# Patient Record
Sex: MALE | Race: WHITE | NOT HISPANIC OR LATINO | Employment: OTHER | URBAN - METROPOLITAN AREA
[De-identification: names, ages, dates, MRNs, and addresses within clinical notes are randomized per-mention and may not be internally consistent; named-entity substitution may affect disease eponyms.]

---

## 2019-05-16 ENCOUNTER — TRANSCRIBE ORDERS (OUTPATIENT)
Dept: CARDIOLOGY | Facility: CLINIC | Age: 64
End: 2019-05-16

## 2019-05-16 ENCOUNTER — PREP FOR SURGERY (OUTPATIENT)
Dept: OTHER | Facility: HOSPITAL | Age: 64
End: 2019-05-16

## 2019-05-16 DIAGNOSIS — R94.39 ABNORMAL STRESS TEST: Primary | ICD-10-CM

## 2019-05-16 DIAGNOSIS — I20.9 ANGINA PECTORIS (HCC): Primary | ICD-10-CM

## 2019-05-16 RX ORDER — ASPIRIN 325 MG
325 TABLET, DELAYED RELEASE (ENTERIC COATED) ORAL DAILY
Status: CANCELLED | OUTPATIENT
Start: 2019-05-17

## 2019-05-16 RX ORDER — SODIUM CHLORIDE 0.9 % (FLUSH) 0.9 %
3-10 SYRINGE (ML) INJECTION AS NEEDED
Status: CANCELLED | OUTPATIENT
Start: 2019-05-16

## 2019-05-16 RX ORDER — SODIUM CHLORIDE 0.9 % (FLUSH) 0.9 %
3 SYRINGE (ML) INJECTION EVERY 12 HOURS SCHEDULED
Status: CANCELLED | OUTPATIENT
Start: 2019-05-16

## 2019-05-16 RX ORDER — ONDANSETRON 2 MG/ML
4 INJECTION INTRAMUSCULAR; INTRAVENOUS EVERY 8 HOURS PRN
Status: CANCELLED | OUTPATIENT
Start: 2019-05-16

## 2019-05-16 RX ORDER — ASPIRIN 325 MG
325 TABLET ORAL ONCE
Status: CANCELLED | OUTPATIENT
Start: 2019-05-16 | End: 2019-05-16

## 2019-05-16 RX ORDER — NITROGLYCERIN 0.4 MG/1
0.4 TABLET SUBLINGUAL
Status: CANCELLED | OUTPATIENT
Start: 2019-05-16

## 2019-05-20 ENCOUNTER — APPOINTMENT (OUTPATIENT)
Dept: CARDIOLOGY | Facility: HOSPITAL | Age: 64
End: 2019-05-20

## 2019-05-20 ENCOUNTER — HOSPITAL ENCOUNTER (OUTPATIENT)
Facility: HOSPITAL | Age: 64
Discharge: HOME | End: 2019-05-20
Attending: INTERNAL MEDICINE | Admitting: INTERNAL MEDICINE

## 2019-05-20 VITALS
BODY MASS INDEX: 27.31 KG/M2 | RESPIRATION RATE: 14 BRPM | DIASTOLIC BLOOD PRESSURE: 88 MMHG | TEMPERATURE: 98 F | SYSTOLIC BLOOD PRESSURE: 123 MMHG | WEIGHT: 174 LBS | OXYGEN SATURATION: 95 % | HEIGHT: 67 IN | HEART RATE: 58 BPM

## 2019-05-20 DIAGNOSIS — R94.39 ABNORMAL STRESS TEST: ICD-10-CM

## 2019-05-20 PROBLEM — I10 ESSENTIAL HYPERTENSION: Status: ACTIVE | Noted: 2019-05-20

## 2019-05-20 PROBLEM — I42.0 CARDIOMYOPATHY, DILATED: Status: ACTIVE | Noted: 2019-05-20

## 2019-05-20 LAB
ANION GAP SERPL CALCULATED.3IONS-SCNC: 14 MMOL/L
BH CV ECHO MEAS - LA DIMENSION: 5.2 CM
BUN BLD-MCNC: 8 MG/DL (ref 8–23)
BUN/CREAT SERPL: 12.1 (ref 7–25)
CALCIUM SPEC-SCNC: 8.5 MG/DL (ref 8.6–10.5)
CHLORIDE SERPL-SCNC: 107 MMOL/L (ref 98–107)
CHOLEST SERPL-MCNC: 119 MG/DL (ref 0–200)
CO2 SERPL-SCNC: 20 MMOL/L (ref 22–29)
CREAT BLD-MCNC: 0.66 MG/DL (ref 0.76–1.27)
DEPRECATED RDW RBC AUTO: 45.8 FL (ref 37–54)
ERYTHROCYTE [DISTWIDTH] IN BLOOD BY AUTOMATED COUNT: 13.9 % (ref 12.3–15.4)
GFR SERPL CREATININE-BSD FRML MDRD: 121 ML/MIN/1.73
GLUCOSE BLD-MCNC: 87 MG/DL (ref 65–99)
HBA1C MFR BLD: 5.5 % (ref 4.8–5.6)
HCT VFR BLD AUTO: 42.2 % (ref 37.5–51)
HDLC SERPL-MCNC: 34 MG/DL (ref 40–60)
HGB BLD-MCNC: 14.1 G/DL (ref 13–17.7)
LDLC SERPL CALC-MCNC: 71 MG/DL (ref 0–100)
LDLC/HDLC SERPL: 2.08 {RATIO}
LV EF 2D ECHO EST: 55 %
MCH RBC QN AUTO: 30.3 PG (ref 26.6–33)
MCHC RBC AUTO-ENTMCNC: 33.4 G/DL (ref 31.5–35.7)
MCV RBC AUTO: 90.8 FL (ref 79–97)
PLATELET # BLD AUTO: 230 10*3/MM3 (ref 140–450)
PMV BLD AUTO: 11.1 FL (ref 6–12)
POTASSIUM BLD-SCNC: 3.7 MMOL/L (ref 3.5–5.2)
RBC # BLD AUTO: 4.65 10*6/MM3 (ref 4.14–5.8)
SODIUM BLD-SCNC: 141 MMOL/L (ref 136–145)
TRIGL SERPL-MCNC: 72 MG/DL (ref 0–150)
VLDLC SERPL-MCNC: 14.4 MG/DL
WBC NRBC COR # BLD: 12.54 10*3/MM3 (ref 3.4–10.8)

## 2019-05-20 PROCEDURE — 25010000002 HEPARIN (PORCINE) PER 1000 UNITS: Performed by: INTERNAL MEDICINE

## 2019-05-20 PROCEDURE — 92960 CARDIOVERSION ELECTRIC EXT: CPT

## 2019-05-20 PROCEDURE — 93458 L HRT ARTERY/VENTRICLE ANGIO: CPT | Performed by: INTERNAL MEDICINE

## 2019-05-20 PROCEDURE — C1894 INTRO/SHEATH, NON-LASER: HCPCS | Performed by: INTERNAL MEDICINE

## 2019-05-20 PROCEDURE — 93325 DOPPLER ECHO COLOR FLOW MAPG: CPT

## 2019-05-20 PROCEDURE — 93325 DOPPLER ECHO COLOR FLOW MAPG: CPT | Performed by: INTERNAL MEDICINE

## 2019-05-20 PROCEDURE — C1769 GUIDE WIRE: HCPCS | Performed by: INTERNAL MEDICINE

## 2019-05-20 PROCEDURE — 80048 BASIC METABOLIC PNL TOTAL CA: CPT | Performed by: INTERNAL MEDICINE

## 2019-05-20 PROCEDURE — 93312 ECHO TRANSESOPHAGEAL: CPT

## 2019-05-20 PROCEDURE — 93312 ECHO TRANSESOPHAGEAL: CPT | Performed by: INTERNAL MEDICINE

## 2019-05-20 PROCEDURE — 93321 DOPPLER ECHO F-UP/LMTD STD: CPT | Performed by: INTERNAL MEDICINE

## 2019-05-20 PROCEDURE — 93321 DOPPLER ECHO F-UP/LMTD STD: CPT

## 2019-05-20 PROCEDURE — 36415 COLL VENOUS BLD VENIPUNCTURE: CPT

## 2019-05-20 PROCEDURE — 83036 HEMOGLOBIN GLYCOSYLATED A1C: CPT | Performed by: PHYSICIAN ASSISTANT

## 2019-05-20 PROCEDURE — 0 IOPAMIDOL PER 1 ML: Performed by: INTERNAL MEDICINE

## 2019-05-20 PROCEDURE — 92960 CARDIOVERSION ELECTRIC EXT: CPT | Performed by: INTERNAL MEDICINE

## 2019-05-20 PROCEDURE — 80061 LIPID PANEL: CPT | Performed by: PHYSICIAN ASSISTANT

## 2019-05-20 PROCEDURE — 25010000002 MIDAZOLAM PER 1 MG: Performed by: INTERNAL MEDICINE

## 2019-05-20 PROCEDURE — 85027 COMPLETE CBC AUTOMATED: CPT | Performed by: INTERNAL MEDICINE

## 2019-05-20 RX ORDER — ASPIRIN 325 MG
325 TABLET ORAL ONCE
Status: COMPLETED | OUTPATIENT
Start: 2019-05-20 | End: 2019-05-20

## 2019-05-20 RX ORDER — NITROGLYCERIN 0.4 MG/1
0.4 TABLET SUBLINGUAL
Status: DISCONTINUED | OUTPATIENT
Start: 2019-05-20 | End: 2019-05-20 | Stop reason: HOSPADM

## 2019-05-20 RX ORDER — MIDAZOLAM HYDROCHLORIDE 1 MG/ML
INJECTION INTRAMUSCULAR; INTRAVENOUS AS NEEDED
Status: DISCONTINUED | OUTPATIENT
Start: 2019-05-20 | End: 2019-05-20 | Stop reason: HOSPADM

## 2019-05-20 RX ORDER — LIDOCAINE HYDROCHLORIDE 10 MG/ML
INJECTION, SOLUTION INFILTRATION; PERINEURAL AS NEEDED
Status: DISCONTINUED | OUTPATIENT
Start: 2019-05-20 | End: 2019-05-20 | Stop reason: HOSPADM

## 2019-05-20 RX ORDER — FENTANYL CITRATE 50 UG/ML
INJECTION, SOLUTION INTRAMUSCULAR; INTRAVENOUS
Status: DISCONTINUED
Start: 2019-05-20 | End: 2019-05-20 | Stop reason: WASHOUT

## 2019-05-20 RX ORDER — MIDAZOLAM HYDROCHLORIDE 1 MG/ML
INJECTION INTRAMUSCULAR; INTRAVENOUS
Status: COMPLETED | OUTPATIENT
Start: 2019-05-20 | End: 2019-05-20

## 2019-05-20 RX ORDER — MIDAZOLAM HYDROCHLORIDE 1 MG/ML
INJECTION INTRAMUSCULAR; INTRAVENOUS
Status: DISCONTINUED
Start: 2019-05-20 | End: 2019-05-20 | Stop reason: HOSPADM

## 2019-05-20 RX ORDER — SODIUM CHLORIDE 0.9 % (FLUSH) 0.9 %
3-10 SYRINGE (ML) INJECTION AS NEEDED
Status: DISCONTINUED | OUTPATIENT
Start: 2019-05-20 | End: 2019-05-20 | Stop reason: HOSPADM

## 2019-05-20 RX ORDER — SODIUM CHLORIDE 9 MG/ML
1-3 INJECTION, SOLUTION INTRAVENOUS CONTINUOUS
Status: DISCONTINUED | OUTPATIENT
Start: 2019-05-20 | End: 2019-05-20 | Stop reason: HOSPADM

## 2019-05-20 RX ORDER — ASPIRIN 81 MG/1
81 TABLET ORAL DAILY
COMMUNITY
End: 2023-03-21 | Stop reason: HOSPADM

## 2019-05-20 RX ORDER — LABETALOL 300 MG/1
300 TABLET, FILM COATED ORAL 2 TIMES DAILY
COMMUNITY
End: 2019-05-20 | Stop reason: HOSPADM

## 2019-05-20 RX ORDER — AMLODIPINE BESYLATE 5 MG/1
5 TABLET ORAL DAILY
COMMUNITY
End: 2021-02-02

## 2019-05-20 RX ORDER — SOTALOL HYDROCHLORIDE 80 MG/1
80 TABLET ORAL ONCE
Status: COMPLETED | OUTPATIENT
Start: 2019-05-20 | End: 2019-05-20

## 2019-05-20 RX ORDER — ALBUTEROL SULFATE 90 UG/1
2 AEROSOL, METERED RESPIRATORY (INHALATION) EVERY 4 HOURS PRN
COMMUNITY
End: 2019-07-16

## 2019-05-20 RX ORDER — SOTALOL HYDROCHLORIDE 80 MG/1
80 TABLET ORAL 2 TIMES DAILY
Qty: 60 TABLET | Refills: 6 | Status: SHIPPED | OUTPATIENT
Start: 2019-05-20 | End: 2019-05-23 | Stop reason: SDUPTHER

## 2019-05-20 RX ORDER — OXYCODONE AND ACETAMINOPHEN 10; 325 MG/1; MG/1
1 TABLET ORAL EVERY 6 HOURS PRN
COMMUNITY
End: 2019-07-16

## 2019-05-20 RX ORDER — ASPIRIN 325 MG
325 TABLET, DELAYED RELEASE (ENTERIC COATED) ORAL DAILY
Status: DISCONTINUED | OUTPATIENT
Start: 2019-05-21 | End: 2019-05-20 | Stop reason: HOSPADM

## 2019-05-20 RX ORDER — FLUTICASONE PROPIONATE 50 MCG
2 SPRAY, SUSPENSION (ML) NASAL DAILY
COMMUNITY
End: 2019-07-16

## 2019-05-20 RX ORDER — SODIUM CHLORIDE 9 MG/ML
100 INJECTION, SOLUTION INTRAVENOUS CONTINUOUS
Status: ACTIVE | OUTPATIENT
Start: 2019-05-20 | End: 2019-05-20

## 2019-05-20 RX ORDER — ATORVASTATIN CALCIUM 40 MG/1
40 TABLET, FILM COATED ORAL DAILY
COMMUNITY
End: 2019-07-16

## 2019-05-20 RX ORDER — MELOXICAM 15 MG/1
15 TABLET ORAL DAILY
COMMUNITY
End: 2019-07-16

## 2019-05-20 RX ORDER — NALOXONE HYDROCHLORIDE 0.4 MG/ML
INJECTION, SOLUTION INTRAMUSCULAR; INTRAVENOUS; SUBCUTANEOUS
Status: DISCONTINUED
Start: 2019-05-20 | End: 2019-05-20 | Stop reason: WASHOUT

## 2019-05-20 RX ORDER — ONDANSETRON 2 MG/ML
4 INJECTION INTRAMUSCULAR; INTRAVENOUS EVERY 8 HOURS PRN
Status: DISCONTINUED | OUTPATIENT
Start: 2019-05-20 | End: 2019-05-20 | Stop reason: HOSPADM

## 2019-05-20 RX ORDER — SODIUM CHLORIDE 0.9 % (FLUSH) 0.9 %
3 SYRINGE (ML) INJECTION EVERY 12 HOURS SCHEDULED
Status: DISCONTINUED | OUTPATIENT
Start: 2019-05-20 | End: 2019-05-20 | Stop reason: HOSPADM

## 2019-05-20 RX ORDER — FLUMAZENIL 0.1 MG/ML
INJECTION INTRAVENOUS
Status: DISCONTINUED
Start: 2019-05-20 | End: 2019-05-20 | Stop reason: WASHOUT

## 2019-05-20 RX ORDER — ZOLPIDEM TARTRATE 10 MG/1
10 TABLET ORAL NIGHTLY PRN
COMMUNITY
End: 2019-07-16

## 2019-05-20 RX ADMIN — METHOHEXITAL SODIUM 40 MG: 500 INJECTION, POWDER, LYOPHILIZED, FOR SOLUTION INTRAMUSCULAR; INTRAVENOUS; RECTAL at 10:02

## 2019-05-20 RX ADMIN — SODIUM CHLORIDE 3 ML/KG/HR: 9 INJECTION, SOLUTION INTRAVENOUS at 07:46

## 2019-05-20 RX ADMIN — ASPIRIN 325 MG ORAL TABLET 325 MG: 325 PILL ORAL at 07:46

## 2019-05-20 RX ADMIN — MIDAZOLAM HYDROCHLORIDE 2 MG: 1 INJECTION, SOLUTION INTRAMUSCULAR; INTRAVENOUS at 10:01

## 2019-05-20 RX ADMIN — SOTALOL HYDROCHLORIDE 80 MG: 80 TABLET ORAL at 10:45

## 2019-05-20 RX ADMIN — RIVAROXABAN 20 MG: 20 TABLET, FILM COATED ORAL at 10:45
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
## 2019-05-23 RX ORDER — SOTALOL HYDROCHLORIDE 80 MG/1
80 TABLET ORAL 2 TIMES DAILY
Qty: 60 TABLET | Refills: 6 | Status: SHIPPED | OUTPATIENT
Start: 2019-05-23 | End: 2019-07-16

## 2019-07-16 ENCOUNTER — OFFICE VISIT (OUTPATIENT)
Dept: CARDIOLOGY | Facility: CLINIC | Age: 64
End: 2019-07-16

## 2019-07-16 VITALS
BODY MASS INDEX: 26.68 KG/M2 | HEART RATE: 85 BPM | DIASTOLIC BLOOD PRESSURE: 86 MMHG | HEIGHT: 67 IN | WEIGHT: 170 LBS | SYSTOLIC BLOOD PRESSURE: 127 MMHG

## 2019-07-16 DIAGNOSIS — I10 ESSENTIAL HYPERTENSION: ICD-10-CM

## 2019-07-16 DIAGNOSIS — I42.0 CARDIOMYOPATHY, DILATED (HCC): ICD-10-CM

## 2019-07-16 DIAGNOSIS — I48.21 PERMANENT ATRIAL FIBRILLATION (HCC): Primary | ICD-10-CM

## 2019-07-16 DIAGNOSIS — Z72.0 TOBACCO USE: ICD-10-CM

## 2019-07-16 DIAGNOSIS — I48.0 PAROXYSMAL ATRIAL FIBRILLATION (HCC): ICD-10-CM

## 2019-07-16 DIAGNOSIS — I25.10 CORONARY ARTERY DISEASE INVOLVING NATIVE CORONARY ARTERY OF NATIVE HEART WITHOUT ANGINA PECTORIS: ICD-10-CM

## 2019-07-16 DIAGNOSIS — E78.2 MIXED HYPERLIPIDEMIA: ICD-10-CM

## 2019-07-16 PROCEDURE — 99214 OFFICE O/P EST MOD 30 MIN: CPT | Performed by: INTERNAL MEDICINE

## 2019-07-16 PROCEDURE — 93000 ELECTROCARDIOGRAM COMPLETE: CPT | Performed by: INTERNAL MEDICINE

## 2019-07-16 RX ORDER — ATORVASTATIN CALCIUM 40 MG/1
40 TABLET, FILM COATED ORAL DAILY
Qty: 90 TABLET | Refills: 3 | Status: SHIPPED | OUTPATIENT
Start: 2019-07-16 | End: 2022-05-03

## 2019-07-16 NOTE — PROGRESS NOTES
Encounter Date:07/16/2019        Patient ID: Eliud Crouch is a 64 y.o. male.    PCP: Jimmie Alonso MD     Chief Complaint: Coronary Artery Disease      PROBLEM LIST:  1. Coronary artery disease:  a. Remote septal MI.  b. Previous stent to the RCA.  c. [facility], 08/17/2003, Dr. Enrrique Morse: Patent RCA stent, normal LV.  d. [facility], 10/31/2012: EF 55-60%, mild MR, nonobstructive disease involving multiple vessels.  e. [facility], 02/23/2015: Nonobstructive plaque/disease that involved multiple vessels. EF 35%.  f. Stress test, 05/06/2019: Ischemia and scar in the LAD distribution. EF 50%.  g. [facility], 05/20/2019: Nonobstructive CAD involving multiple vessels without hemodynamically significant disease. EF 55%.  2. Atrial fibrillation:  a. PIPER, 05/20/2019: EF 55%. Left atrial cavity is moderately dilated. Mild MR/TR. No intracardial thrombus or mass, clear DACIA.  b. ECV, 05/20/2019: Successful cardioversion.  3. Dilated cardiomyopathy:  a. PIPER, 02/22/2015: Dilated cardiomyopathy with EF 40%.  4. Hypertensio.  5. Hyperlipidemia.  6. Paroxysmal atrial fibrillation:  a. History of atrial fibrillation with external cardioversion 2003.  b. EKGs and Holter monitoring showing atrial fibrillation with controlled rate.  c. CHADS-VASc score = 3  d. ECV to SR 2-23-15  e. Recurrent AFib with controlled rate  7. Carotid disease:  a. Carotid duplex, 10/2012: Minimal plaque, normal velocities.  8. Tobacco use:  a. Smokes 2 PPD per 7/16/2019 visit.  9. GERD.  10. Osteoarthritis.  11. Chronic neck and back pain.    History of Present Illness  Patient presents today for hospital follow-up s/p catheterization and cardioversion. Patient has a history of coronary artery disease, atrial fibrillation, dilated cardiomyopathy, and cardiac risk factors. Since last visit, he has been doing well overall from a cardiovascular standpoint. He was taken off sotalol, details are not available patient states that it was by his PCP's office. Denies  "chest pain, shortness of breath, leg swelling, palpitations, and syncope. Remains busy and active with no significant cardiac limitations. Patient admits he still smokes 2 packs of cigarettes per day.    Allergies   Allergen Reactions   • Morphine Anxiety         Current Outpatient Medications:   •  amLODIPine (NORVASC) 5 MG tablet, Take 5 mg by mouth Daily., Disp: , Rfl:   •  apixaban (ELIQUIS) 5 MG tablet tablet, Take 5 mg by mouth 2 (Two) Times a Day., Disp: , Rfl:   •  aspirin 81 MG EC tablet, Take 81 mg by mouth Daily., Disp: , Rfl:     The following portions of the patient's history were reviewed and updated as appropriate: allergies, current medications, past family history, past medical history, past social history, past surgical history and problem list.    ROS  Review of Systems   Constitution: Negative for chills, fatigue, fever, generalized weakness, weight gain and weight loss.   Cardiovascular: Negative for chest pain, claudication, dyspnea on exertion, leg swelling, orthopnea, palpitations, paroxysmal nocturnal dyspnea and syncope.   Respiratory: Negative for cough, shortness of breath, and wheezing.  HENT: Negative for ear pain, nosebleeds, and tinnitus.  Gastrointestinal: Negative for abdominal pain, constipation, diarrhea, nausea and vomiting.   Genitourinary: No urinary symptoms   Musculoskeletal: Negative for muscle cramps.  Neurological: Negative for dizziness, headaches, loss of balance, numbness, and symptoms of stroke.  Psychiatric: Normal mental status.     All other systems reviewed and are negative.    Objective:     /86 (BP Location: Right arm, Patient Position: Sitting)   Pulse 85   Ht 170.2 cm (67\")   Wt 77.1 kg (170 lb)   BMI 26.63 kg/m²        Physical Exam  Constitutional: Patient appears well-developed and well-nourished.   HENT: HEENT exam unremarkable.   Neck: Neck supple. No JVD present. No carotid bruits.   Cardiovascular: Normal rate, regular rhythm and normal heart " sounds. No murmur heard.   2+ symmetric pulses.   Pulmonary/Chest: Breath sounds normal. Does not exhibit tenderness.   Abdominal: Abdomen benign.   Musculoskeletal: Does not exhibit edema.   Neurological: Neurological exam unremarkable.   Vitals reviewed.    Lab Review:   Lab Results   Component Value Date    GLUCOSE 87 05/20/2019    BUN 8 05/20/2019    CREATININE 0.66 (L) 05/20/2019    EGFRIFNONA 121 05/20/2019    BCR 12.1 05/20/2019    K 3.7 05/20/2019    CO2 20.0 (L) 05/20/2019    CALCIUM 8.5 (L) 05/20/2019     Lab Results   Component Value Date    CHOL 119 05/20/2019    CHLPL 91 02/23/2015    TRIG 72 05/20/2019    HDL 34 (L) 05/20/2019    LDL 71 05/20/2019      Lab Results   Component Value Date    WBC 12.54 (H) 05/20/2019    HGB 14.1 05/20/2019    HCT 42.2 05/20/2019    MCV 90.8 05/20/2019     05/20/2019     Lab Results   Component Value Date    HGBA1C 5.50 05/20/2019          ECG 12 Lead  Date/Time: 7/16/2019 11:17 AM  Performed by: Rick Whitten MD  Authorized by: Rick Whitten MD   Comparison: compared with previous ECG   Similar to previous ECG  Rhythm: atrial fibrillation    Clinical impression: non-specific ECG  Comments: Non specific ST changes               Assessment:      Diagnosis Plan   1. Permanent atrial fibrillation (CMS/HCC)  Continue Eliquis 5 mg BID for stroke prophylaxis.  Rate is controlled without medications, he has failed to maintain sinus rhythm and was taken off sotalol, remain off sotalol for now.   2. Coronary artery disease involving native coronary artery of native heart without angina pectoris  Continue aspirin 81 mg.   3. Cardiomyopathy, dilated (CMS/HCC)  Stable with no heart failure symptoms.   4. Essential hypertension  Well-controlled, continue current medications.   5. Mixed hyperlipidemia  Get back on atorvastatin 40 mg which she discontinued due to unclear reasons..   6. Tobacco use  Smoking cessation strongly recommended.     Plan:   Get back on atorvastatin 40  mg.  Continue current medications.   Smoking cessation strongly recommended.  FU in 6 MO, sooner as needed.  Thank you for allowing us to participate in the care of your patient.     Scribed for Rick Whitten MD by Sherron Vasquez. 7/16/2019  11:21 AM      I, Rick Whitten MD, personally performed the services described in this documentation as scribed by the above named individual in my presence, and it is both accurate and complete.  7/16/2019  11:53 AM        Please note that portions of this note may have been completed with a voice recognition program. Efforts were made to edit the dictations, but occasionally words are mistranscribed.

## 2020-01-21 ENCOUNTER — OFFICE VISIT (OUTPATIENT)
Dept: CARDIOLOGY | Facility: CLINIC | Age: 65
End: 2020-01-21

## 2020-01-21 VITALS
DIASTOLIC BLOOD PRESSURE: 80 MMHG | HEIGHT: 67 IN | HEART RATE: 53 BPM | SYSTOLIC BLOOD PRESSURE: 110 MMHG | WEIGHT: 171 LBS | BODY MASS INDEX: 26.84 KG/M2

## 2020-01-21 DIAGNOSIS — E78.2 MIXED HYPERLIPIDEMIA: ICD-10-CM

## 2020-01-21 DIAGNOSIS — I48.21 PERMANENT ATRIAL FIBRILLATION (HCC): Primary | ICD-10-CM

## 2020-01-21 DIAGNOSIS — I25.119 CORONARY ARTERY DISEASE INVOLVING NATIVE CORONARY ARTERY OF NATIVE HEART WITH ANGINA PECTORIS (HCC): ICD-10-CM

## 2020-01-21 DIAGNOSIS — I10 ESSENTIAL HYPERTENSION: ICD-10-CM

## 2020-01-21 DIAGNOSIS — Z72.0 TOBACCO USE: ICD-10-CM

## 2020-01-21 PROCEDURE — 99214 OFFICE O/P EST MOD 30 MIN: CPT | Performed by: INTERNAL MEDICINE

## 2020-01-21 RX ORDER — ISOSORBIDE MONONITRATE 30 MG/1
30 TABLET, EXTENDED RELEASE ORAL DAILY
Qty: 90 TABLET | Refills: 3 | Status: SHIPPED | OUTPATIENT
Start: 2020-01-21 | End: 2021-02-02

## 2020-01-21 NOTE — PROGRESS NOTES
[facility]    Encounter Date:2020        Patient ID: Eliud Crouch is a  64 y.o. male.  : 1955     PCP: Jimmie Alonso MD     Chief Complaint: Permanent atrial fibrillation (CMS/HCC)    PROBLEM LIST:  1. Coronary artery disease:  a. Remote septal MI.  b. Previous stent to the RCA.  c. [facility], 2003, Dr. Enrrique Morse: Patent RCA stent, normal LV.  d. [facility], 10/31/2012: EF 55-60%, mild MR, nonobstructive disease involving multiple vessels.  e. [facility], 2015: Nonobstructive plaque/disease that involved multiple vessels. EF 35%.  f. Stress test, 2019: Ischemia and scar in the LAD distribution. EF 50%.  g. [facility], 2019: Nonobstructive CAD involving multiple vessels without hemodynamically significant disease. EF 55%.  2. Paroxysmal atrial fibrillation:  a. CHADS-VASc score = 3 (HTN, Age 65-74, CAD)  b. History of atrial fibrillation with external cardioversion .  c. EKGs and Holter monitoring showing atrial fibrillation with controlled rate.  d. PIPER/ECV to SR, 2015.  e. Recurrent AFib with controlled rate  f. PIPER, 2019: EF 55%. Left atrial cavity is moderately dilated. Mild MR/TR. No intracardial thrombus or mass, clear DACIA.  g. ECV, 2019: Successful cardioversion.  3. Dilated cardiomyopathy:  a. PIPER, 2015: Dilated cardiomyopathy with EF 40%.  4. Hypertension.  5. Hyperlipidemia.  6. Carotid plaque:  a. Carotid duplex, 10/2012: Minimal plaque, normal velocities.  7. Tobacco use:  a. Smokes 2 PPD per 2019 visit.  8. GERD.  9. Osteoarthritis.  10. Chronic neck and back pain.    History of Present Illness  Patient presents today for 6 month follow-up with a history of coronary artery disease, paroxysmal atrial fibrillation, and cardiac risk factors. Since last visit, he has been experiencing some shortness of breath when walking, climbing stairs, and bending over. He also reports some chest pain, which he  "describes as a burning sensation. This happens \"pretty much all the time\", and sometimes wakes him from sleep. Pt admits he still smokes, but does want to try to quit.    Allergies   Allergen Reactions   • Morphine Anxiety         Current Outpatient Medications:   •  amLODIPine (NORVASC) 5 MG tablet, Take 5 mg by mouth Daily., Disp: , Rfl:   •  apixaban (ELIQUIS) 5 MG tablet tablet, Take 5 mg by mouth 2 (Two) Times a Day., Disp: , Rfl:   •  aspirin 81 MG EC tablet, Take 81 mg by mouth Daily., Disp: , Rfl:   •  atorvastatin (LIPITOR) 40 MG tablet, Take 1 tablet by mouth Daily., Disp: 90 tablet, Rfl: 3    The following portions of the patient's history were reviewed and updated as appropriate: allergies, current medications, past family history, past medical history, past social history, past surgical history and problem list.    ROS  Review of Systems   Constitution: Negative for chills, fatigue, fever, generalized weakness.   Cardiovascular: Negative for claudication, leg swelling, orthopnea, palpitations, paroxysmal nocturnal dyspnea and syncope. Positive for chest pain and dyspnea on exertion.  Respiratory: Negative for cough and wheezing. Positive for shortness of breath.  HENT: Negative for ear pain, nosebleeds, and tinnitus.  Gastrointestinal: Negative for abdominal pain, constipation, diarrhea, nausea and vomiting. Positive for heartburn.  Genitourinary: No urinary symptoms.  Musculoskeletal: Negative for muscle cramps.  Neurological: Negative for dizziness, headaches, loss of balance, numbness, and symptoms of stroke.  Psychiatric: Normal mental status.     All other systems reviewed and are negative.    Objective:     /80 (BP Location: Left arm, Patient Position: Sitting)   Pulse 53   Ht 170.2 cm (67\")   Wt 77.6 kg (171 lb)   BMI 26.78 kg/m²        Physical Exam  Constitutional: Patient appears well-developed and well-nourished.   HENT: HEENT exam unremarkable.   Neck: Neck supple. No JVD present. " No carotid bruits.   Cardiovascular: Normal rate, regular rhythm and normal heart sounds. No murmur heard.   2+ symmetric pulses.   Pulmonary/Chest: Breath sounds normal. Does not exhibit tenderness.   Abdominal: Abdomen benign.   Musculoskeletal: Does not exhibit edema.   Neurological: Neurological exam unremarkable.   Vitals reviewed.    Lab Review:   Lab Results   Component Value Date    GLUCOSE 87 05/20/2019    BUN 8 05/20/2019    CREATININE 0.66 (L) 05/20/2019    EGFRIFNONA 121 05/20/2019    BCR 12.1 05/20/2019    K 3.7 05/20/2019    CO2 20.0 (L) 05/20/2019    CALCIUM 8.5 (L) 05/20/2019     Lab Results   Component Value Date    CHOL 119 05/20/2019    CHLPL 91 02/23/2015    TRIG 72 05/20/2019    HDL 34 (L) 05/20/2019    LDL 71 05/20/2019      Lab Results   Component Value Date    WBC 12.54 (H) 05/20/2019    HGB 14.1 05/20/2019    HCT 42.2 05/20/2019    MCV 90.8 05/20/2019     05/20/2019     Lab Results   Component Value Date    HGBA1C 5.50 05/20/2019        Procedures       Assessment:      Diagnosis Plan   1. Permanent atrial fibrillation  Continue Eliquis 5 mg BID. Pt and his family were educated on the importance of anticoagulation for stroke prophylaxis given his permanent Afib.   2. Coronary artery disease involving native coronary artery of native heart with angina pectoris  [facility] from May 2019 was reviewed with the patient. Start Imdur 30 mg daily for chest pain. Continue aspirin 81 mg for antiplatelet therapy.    3. Essential hypertension  Well-controlled, continue amlodipine 5 mg.   4. Mixed hyperlipidemia  Well-controlled, continue atorvastatin 40 mg.   5. Tobacco abuse Smoking cessation strongly recommended.     Plan:   Smoking cessation strongly recommended.  Start Imdur 30 mg daily for chest pain, possibly spastic angina.  Findings of unremarkable cardiac catheterization study discussed and he was reassured.  Continue current medications.   FU in 6 MO, sooner as needed.  Thank you for allowing  us to participate in the care of your patient.     Scribed for Rick Whitten MD by Sherron Vasquez. 1/21/2020  12:03 PM      I, Rick Whitten MD, personally performed the services described in this documentation as scribed by the above named individual in my presence, and it is both accurate and complete.  1/21/2020  12:07 PM      Please note that portions of this note may have been completed with a voice recognition program. Efforts were made to edit the dictations, but occasionally words are mistranscribed.

## 2020-01-28 ENCOUNTER — TELEPHONE (OUTPATIENT)
Dept: CARDIOLOGY | Facility: CLINIC | Age: 65
End: 2020-01-28

## 2020-01-28 NOTE — TELEPHONE ENCOUNTER
Pt calling requesting CC for EGD and Colonoscopy. Pt currently on Eliquis and ASA.       CC Letter to be faxed to [phone]

## 2020-01-28 NOTE — TELEPHONE ENCOUNTER
Cleared from cardiology standpoint, okay to hold Eliquis for 2 to 3 days and aspirin if his gastroenterologist insists.

## 2020-08-04 ENCOUNTER — OFFICE VISIT (OUTPATIENT)
Dept: CARDIOLOGY | Facility: CLINIC | Age: 65
End: 2020-08-04

## 2020-08-04 VITALS
HEART RATE: 58 BPM | HEIGHT: 67 IN | DIASTOLIC BLOOD PRESSURE: 88 MMHG | SYSTOLIC BLOOD PRESSURE: 128 MMHG | BODY MASS INDEX: 25.9 KG/M2 | WEIGHT: 165 LBS

## 2020-08-04 DIAGNOSIS — I25.119 CORONARY ARTERY DISEASE INVOLVING NATIVE CORONARY ARTERY OF NATIVE HEART WITH ANGINA PECTORIS (HCC): Primary | ICD-10-CM

## 2020-08-04 DIAGNOSIS — E78.2 MIXED HYPERLIPIDEMIA: ICD-10-CM

## 2020-08-04 DIAGNOSIS — I48.21 PERMANENT ATRIAL FIBRILLATION (HCC): ICD-10-CM

## 2020-08-04 DIAGNOSIS — I10 ESSENTIAL HYPERTENSION: ICD-10-CM

## 2020-08-04 PROCEDURE — 99214 OFFICE O/P EST MOD 30 MIN: CPT | Performed by: INTERNAL MEDICINE

## 2020-08-04 NOTE — PROGRESS NOTES
[facility] Cardiology    Encounter Date: 2020    Patient ID: Eliud Crouch is a 65 y.o. male.  : 1955     PCP: Jimmie lAonso MD       Chief Complaint: No chief complaint on file.      PROBLEM LIST:  1. Coronary artery disease:  a. Remote septal MI.  b. Previous stent to the RCA.  c. [facility], 2003, Dr. Enrrique Morse: Patent RCA stent, normal LV.  d. [facility], 10/31/2012: EF 55-60%, mild MR, nonobstructive disease involving multiple vessels.  e. [facility], 2015: Nonobstructive plaque/disease that involved multiple vessels. EF 35%.  f. Stress test, 2019: Ischemia and scar in the LAD distribution. EF 50%.  g. C, 2019: Nonobstructive CAD involving multiple vessels without hemodynamically significant disease. EF 55%.  2. Paroxysmal atrial fibrillation:  a. CHADS-VASc score = 3 (HTN, Age 65-74, CAD)  b. History of atrial fibrillation with external cardioversion .  c. EKGs and Holter monitoring showing atrial fibrillation with controlled rate.  d. PIPER/ECV to SR, 2015.  e. Recurrent AFib with controlled rate  f. PIPER, 2019: EF 55%. Left atrial cavity is moderately dilated. Mild MR/TR. No intracardial thrombus or mass, clear DACIA.  g. ECV, 2019: Successful cardioversion.  3. Dilated cardiomyopathy:  a. PIPER, 2015: Dilated cardiomyopathy with EF 40%.  4. Hypertension.  5. Hyperlipidemia.  6. Carotid plaque:  a. Carotid duplex, 10/2012: Minimal plaque, normal velocities.  7. Tobacco use:  a. Smokes 2 PPD per 2019 visit.  8. GERD.  9. Osteoarthritis.  10. Chronic neck and back pain.    History of Present Illness  Patient presents today for 6-month follow-up with a history of CAD, PAF, dilated cardiomyopathy and cardiac risk factors. Since last visit, ***    Allergies   Allergen Reactions   • Morphine Anxiety         Current Outpatient Medications:   •  amLODIPine (NORVASC) 5 MG tablet, Take 5 mg by mouth Daily., Disp: , Rfl:   •   apixaban (ELIQUIS) 5 MG tablet tablet, Take 5 mg by mouth 2 (Two) Times a Day., Disp: , Rfl:   •  aspirin 81 MG EC tablet, Take 81 mg by mouth Daily., Disp: , Rfl:   •  atorvastatin (LIPITOR) 40 MG tablet, Take 1 tablet by mouth Daily., Disp: 90 tablet, Rfl: 3  •  isosorbide mononitrate (IMDUR) 30 MG 24 hr tablet, Take 1 tablet by mouth Daily., Disp: 90 tablet, Rfl: 3    The following portions of the patient's history were reviewed and updated as appropriate: allergies, current medications, past family history, past medical history, past social history, past surgical history and problem list.    ROS  Review of Systems   Constitution: Negative for chills, fever, fatigue, generalized weakness.   Cardiovascular: Negative for chest pain, dyspnea on exertion, leg swelling, palpitations, orthopnea, and syncope.   Respiratory: Negative for cough, shortness of breath, and wheezing.  HENT: Negative for ear pain, nosebleeds, and tinnitus.  Gastrointestinal: Negative for abdominal pain, constipation, diarrhea, nausea and vomiting.   Genitourinary: No urinary symptoms.  Musculoskeletal: Negative for muscle cramps.  Neurological: Negative for dizziness, headaches, loss of balance, numbness, and symptoms of stroke.  Psychiatric: Normal mental status.     All other systems reviewed and are negative.        Objective:     There were no vitals taken for this visit.     Physical Exam  Constitutional: Patient appears well-developed and well-nourished.   HENT: HEENT exam unremarkable.   Neck: Neck supple. No JVD present. No carotid bruits.   Cardiovascular: Normal rate, regular rhythm and normal heart sounds. No murmur heard.   2+ symmetric pulses.   Pulmonary/Chest: Breath sounds normal. Does not exhibit tenderness.   Abdominal: Abdomen benign.   Musculoskeletal: Does not exhibit edema.   Neurological: Neurological exam unremarkable.   Vitals reviewed.    Data Review:   Lab Results   Component Value Date    GLUCOSE 87 05/20/2019     BUN 8 05/20/2019    CREATININE 0.66 (L) 05/20/2019    EGFRIFNONA 121 05/20/2019    BCR 12.1 05/20/2019    K 3.7 05/20/2019    CO2 20.0 (L) 05/20/2019    CALCIUM 8.5 (L) 05/20/2019     Lab Results   Component Value Date    CHOL 119 05/20/2019    TRIG 72 05/20/2019    HDL 34 (L) 05/20/2019    LDL 71 05/20/2019         Procedures       Assessment:   No diagnosis found.  Plan:   ***  Continue current medications.   FU in *** MO, sooner as needed.  Thank you for allowing us to participate in the care of your patient.         ***    Please note that portions of this note may have been completed with a voice recognition program. Efforts were made to edit the dictations, but occasionally words are mistranscribed.

## 2020-08-04 NOTE — PROGRESS NOTES
[facility] Cardiology    Encounter Date: 2020    Patient ID: Eliud Crouch is a  65 y.o. male.  : 1955     PCP: Jimmie Alonso MD       Chief Complaint: Permanent atrial fibrillation      PROBLEM LIST:  1. Coronary artery disease:  a. Remote septal MI.  b. Previous stent to the RCA.  c. [facility], 2003, Dr. Enrrique Morse: Patent RCA stent, normal LV.  d. [facility], 10/31/2012: EF 55-60%, mild MR, nonobstructive disease involving multiple vessels.  e. [facility], 2015: Nonobstructive plaque/disease that involved multiple vessels. EF 35%.  f. Stress test, 2019: Ischemia and scar in the LAD distribution. EF 50%.  g. C, 2019: Nonobstructive CAD involving multiple vessels without hemodynamically significant disease. EF 55%.  2. Paroxysmal atrial fibrillation:  a. CHADS-VASc score = 3 (HTN, Age 65-74, CAD)  b. History of atrial fibrillation with external cardioversion .  c. EKGs and Holter monitoring showing atrial fibrillation with controlled rate.  d. PIPER/ECV to SR, 2015.  e. Recurrent AFib with controlled rate  f. PIPER, 2019: EF 55%. Left atrial cavity is moderately dilated. Mild MR/TR. No intracardial thrombus or mass, clear DACIA.  g. ECV, 2019: Successful cardioversion.  3. Dilated cardiomyopathy:  a. PIPER, 2015: DCM with EF 40%.  b. PIPER, 2019: EF 55%  4. Hypertension.  5. Hyperlipidemia.  6. Carotid plaque:  a. Carotid duplex, 10/2012: Minimal plaque, normal velocities.  7. Tobacco use:  a. Smokes 2 PPD per 2019 visit.  8. GERD.  9. Osteoarthritis.  10. Chronic neck and back pain.    History of Present Illness  Patient presents today for a 6 month follow-up with a history of coronary artery disease, paroxysmal atrial fibrillation, DCM, and cardiac risk factors. Since last visit, he has been feeling well overall from a cardiovascular standpoint. He states that he occasionally experience shortness of breath. He  "continues to smoke cigarettes and does not see a lung specialist. Patient otherwise denies chest pain, edema, palpitations, syncope, or presyncope at this time.    Allergies   Allergen Reactions   • Morphine Anxiety         Current Outpatient Medications:   •  amLODIPine (NORVASC) 5 MG tablet, Take 5 mg by mouth Daily., Disp: , Rfl:   •  apixaban (ELIQUIS) 5 MG tablet tablet, Take 5 mg by mouth 2 (Two) Times a Day., Disp: , Rfl:   •  aspirin 81 MG EC tablet, Take 81 mg by mouth Daily., Disp: , Rfl:   •  atorvastatin (LIPITOR) 40 MG tablet, Take 1 tablet by mouth Daily., Disp: 90 tablet, Rfl: 3  •  isosorbide mononitrate (IMDUR) 30 MG 24 hr tablet, Take 1 tablet by mouth Daily., Disp: 90 tablet, Rfl: 3    The following portions of the patient's history were reviewed and updated as appropriate: allergies, current medications, past family history, past medical history, past social history, past surgical history and problem list.    ROS  Review of Systems   Constitution: Negative for chills, fever, fatigue, generalized weakness.   Cardiovascular: Negative for chest pain, dyspnea on exertion, leg swelling, palpitations, orthopnea, and syncope.   Respiratory: Negative for cough and wheezing.  HENT: Negative for ear pain, nosebleeds, and tinnitus.  Gastrointestinal: Negative for abdominal pain, constipation, diarrhea, nausea and vomiting.   Genitourinary: No urinary symptoms.  Musculoskeletal: Negative for muscle cramps.  Neurological: Negative for dizziness, headaches, loss of balance, numbness, and symptoms of stroke.  Psychiatric: Normal mental status.     All other systems reviewed and are negative.        Objective:     /88 (BP Location: Left arm, Patient Position: Sitting)   Pulse 58   Ht 170.2 cm (67\")   Wt 74.8 kg (165 lb)   BMI 25.84 kg/m²      Physical Exam  Constitutional: Patient appears well-developed and well-nourished.   HENT: HEENT exam unremarkable.   Neck: Neck supple. No JVD present. No carotid " bruits.   Cardiovascular: Normal rate, regular rhythm and normal heart sounds. No murmur heard.   2+ symmetric pulses.   Pulmonary/Chest: Breath sounds normal. Does not exhibit tenderness.   Abdominal: Abdomen benign.   Musculoskeletal: Does not exhibit edema.   Neurological: Neurological exam unremarkable.   Vitals reviewed.    Data Review:   Lab Results   Component Value Date    GLUCOSE 87 05/20/2019    BUN 8 05/20/2019    CREATININE 0.66 (L) 05/20/2019    EGFRIFNONA 121 05/20/2019    BCR 12.1 05/20/2019    K 3.7 05/20/2019    CO2 20.0 (L) 05/20/2019    CALCIUM 8.5 (L) 05/20/2019     Lab Results   Component Value Date    CHOL 119 05/20/2019    CHLPL 91 02/23/2015    TRIG 72 05/20/2019    HDL 34 (L) 05/20/2019    LDL 71 05/20/2019      Lab Results   Component Value Date    WBC 12.54 (H) 05/20/2019    RBC 4.65 05/20/2019    HGB 14.1 05/20/2019    HCT 42.2 05/20/2019    MCV 90.8 05/20/2019     05/20/2019     Lab Results   Component Value Date    HGBA1C 5.50 05/20/2019        Procedures       Assessment:      Diagnosis Plan   1. Coronary artery disease involving native coronary artery of native heart with angina pectoris (CMS/HCC)  Stable; Continue current medications.   2. Permanent atrial fibrillation (CMS/HCC)  Continue current medications.   3. Mixed hyperlipidemia  Well-controlled; Continue atorvastatin 40 mg.   4. Essential hypertension  Well-controlled; Continue amlodipine 5 mg,      Plan:   Patient advised to quit smoking and discuss his dyspnea with PCP, may need pulmonology evaluation.  His cardiac status is stable.  Continue current medications.   FU in 12 MO, sooner as needed.  Thank you for allowing us to participate in the care of your patient.     Scribed for Rick Whitten MD by Marilee Guajardo. 8/4/2020  12:24     I, Rick Whitten MD, personally performed the services described in this documentation as scribed by the above named individual in my presence, and it is both accurate and complete.   8/4/2020  17:57            Please note that portions of this note may have been completed with a voice recognition program. Efforts were made to edit the dictations, but occasionally words are mistranscribed.

## 2021-02-01 PROBLEM — R94.39 ABNORMAL STRESS TEST: Status: RESOLVED | Noted: 2019-05-16 | Resolved: 2021-02-01

## 2021-02-02 ENCOUNTER — OFFICE VISIT (OUTPATIENT)
Dept: CARDIOLOGY | Facility: CLINIC | Age: 66
End: 2021-02-02

## 2021-02-02 VITALS
DIASTOLIC BLOOD PRESSURE: 80 MMHG | BODY MASS INDEX: 26.07 KG/M2 | OXYGEN SATURATION: 98 % | HEART RATE: 66 BPM | SYSTOLIC BLOOD PRESSURE: 138 MMHG | WEIGHT: 172 LBS | HEIGHT: 68 IN

## 2021-02-02 DIAGNOSIS — I63.9 CEREBROVASCULAR ACCIDENT (CVA), UNSPECIFIED MECHANISM (HCC): ICD-10-CM

## 2021-02-02 DIAGNOSIS — I48.21 PERMANENT ATRIAL FIBRILLATION (HCC): ICD-10-CM

## 2021-02-02 DIAGNOSIS — I10 ESSENTIAL HYPERTENSION: ICD-10-CM

## 2021-02-02 DIAGNOSIS — I42.0 CARDIOMYOPATHY, DILATED (HCC): ICD-10-CM

## 2021-02-02 DIAGNOSIS — I25.119 CORONARY ARTERY DISEASE INVOLVING NATIVE CORONARY ARTERY OF NATIVE HEART WITH ANGINA PECTORIS (HCC): Primary | ICD-10-CM

## 2021-02-02 PROCEDURE — 99214 OFFICE O/P EST MOD 30 MIN: CPT | Performed by: PHYSICIAN ASSISTANT

## 2021-02-02 RX ORDER — PANTOPRAZOLE SODIUM 40 MG/1
40 TABLET, DELAYED RELEASE ORAL DAILY
COMMUNITY
End: 2023-03-21 | Stop reason: HOSPADM

## 2021-02-02 RX ORDER — ALBUTEROL SULFATE 90 UG/1
2 AEROSOL, METERED RESPIRATORY (INHALATION) EVERY 4 HOURS PRN
COMMUNITY

## 2021-02-02 RX ORDER — LABETALOL 300 MG/1
300 TABLET, FILM COATED ORAL 2 TIMES DAILY
Status: ON HOLD | COMMUNITY
End: 2023-03-20

## 2021-02-02 RX ORDER — OXYCODONE AND ACETAMINOPHEN 10; 325 MG/1; MG/1
1 TABLET ORAL EVERY 6 HOURS PRN
COMMUNITY

## 2021-02-02 NOTE — PROGRESS NOTES
[facility] Cardiology    Encounter Date: 2021    Patient ID: Eliud Crouch is a 65 y.o. male.  : 1955     PCP: Jimmie Alonso MD       Chief Complaint: Coronary Artery Disease      PROBLEM LIST:  1. Coronary artery disease:  a. Remote septal MI.  b. Previous stent to the RCA.  c. [facility], 2003, Dr. Enrrique Morse: Patent RCA stent, normal LV.  d. [facility], 10/31/2012: EF 55-60%, mild MR, nonobstructive disease involving multiple vessels.  e. [facility], 2015: Nonobstructive plaque/disease that involved multiple vessels. EF 35%.  f. [facility], 2019: Nonobstructive CAD involving multiple vessels without hemodynamically significant disease. EF 55%.  g. Echocardiogram, 2020: Normal EF. Atrial fibrillation noted.   2. Paroxysmal atrial fibrillation:  a. CHADS-VASc score = 3 (HTN, Age 65-74, CAD)  b. PIPER/ECV to SR, 2015.  c. PIPER, 2019: EF 55%. Left atrial cavity is moderately dilated. Mild MR/TR. No intracardial thrombus or mass, clear DACIA.  d. ECV, 2019: Successful cardioversion.  3. Dilated cardiomyopathy:  a. PIPER, 2015: DCM with EF 40%.  b. PIPER, 2019: EF 55%  4. Hypertension.  5. Hyperlipidemia.  6. Carotid plaque:  a. Carotid duplex, 10/2012: Minimal plaque, normal velocities.  7. Tobacco use:  a. Smokes 2 PPD per 2019 visit.  8. GERD.  9. Osteoarthritis.  10. Chronic neck and back pain.    History of Present Illness  Patient presents today for a follow-up with a history of coronary artery disease, permanent atrial fibrillation, and cardiac risk factors. The patient presented to [facility] on 2020 due to elevated fevers and altered mental status. Since last visit, he has been diagnosed with strokes by his primary care.  He has no current complaint exertional chest pain or dyspnea and orthopnea no PND no claudication no lower extremity edema.  Has had no awareness of tachyarrhythmias, no dizziness or syncope.  He  "does not check his blood pressure at home.  He states he had a recent lipid panel through primary care which was reported to be acceptable.  Unfortunately he is started smoking again.  He has no report of unilateral weakness or speech changes but is complaining of frequent headaches.  He states compliance with current medical regimen ports no significant adverse side effects.    Allergies   Allergen Reactions   • Morphine Anxiety         Current Outpatient Medications:   •  albuterol sulfate  (90 Base) MCG/ACT inhaler, Inhale 2 puffs Every 4 (Four) Hours As Needed for Wheezing., Disp: , Rfl:   •  apixaban (ELIQUIS) 5 MG tablet tablet, Take 5 mg by mouth 2 (Two) Times a Day., Disp: , Rfl:   •  aspirin 81 MG EC tablet, Take 81 mg by mouth Daily., Disp: , Rfl:   •  atorvastatin (LIPITOR) 40 MG tablet, Take 1 tablet by mouth Daily., Disp: 90 tablet, Rfl: 3  •  labetalol (NORMODYNE) 300 MG tablet, Take 300 mg by mouth 2 (Two) Times a Day., Disp: , Rfl:   •  oxyCODONE-acetaminophen (PERCOCET)  MG per tablet, Take 1 tablet by mouth Every 6 (Six) Hours As Needed for Moderate Pain ., Disp: , Rfl:   •  pantoprazole (PROTONIX) 40 MG EC tablet, Take 40 mg by mouth Daily., Disp: , Rfl:     The following portions of the patient's history were reviewed and updated as appropriate: allergies, current medications, past family history, past medical history, past social history, past surgical history and problem list.          Objective:     /80 (BP Location: Left arm, Patient Position: Sitting)   Pulse 66   Ht 172.7 cm (68\")   Wt 78 kg (172 lb)   SpO2 98%   BMI 26.15 kg/m²      Constitutional:       Appearance: Well-developed.   Neck:      Vascular: No carotid bruit.   Pulmonary:      Effort: Pulmonary effort is normal. No respiratory distress.      Breath sounds: Normal breath sounds. No wheezing. No rales.      Comments: Bases clear  Chest:      Chest wall: Not tender to palpatation.   Cardiovascular:      " Normal rate. Irregular rhythm.      Murmurs: There is no murmur.      No gallop. No click. No rub.   Pulses:     Intact distal pulses.   Edema:     Peripheral edema absent.   Musculoskeletal: Normal range of motion.           Data Review:   Lab date: 11/22/2020  • BMP: Glu 126, BUN 15, Creat 0.59, eGFR 137, Na 139, K 5, Cl 108, CO2 25, Ca 8.6  • CBC: WBC 13.4, RBC 4.23, HGB 12.8, HCT 39.5, MCV 93.5, MCH 30.3,       Lab Results   Component Value Date    CHOL 119 05/20/2019    TRIG 72 05/20/2019    HDL 34 (L) 05/20/2019    LDL 71 05/20/2019         Procedures       Assessment:      Diagnosis Plan   1. Coronary artery disease involving native coronary artery of native heart with angina pectoris (CMS/HCC)   stable without current anginal symptoms, continue current medical regimen   2. Cardiomyopathy, dilated (CMS/HCC)   stable, no current heart failure symptoms, continue current medical regimen   3. Essential hypertension   well managed on current medical regimen   4. Permanent atrial fibrillation (CMS/HCC)   rate controlled and chronically anticoagulated   5. Cerebrovascular accident (CVA), unspecified mechanism (CMS/HCC)  Duplex Carotid Ultrasound CAR     Plan:     Continue current medications.   FU in 6 MO, sooner as needed.  Thank you for allowing us to participate in the care of your patient.         Electronically signed by NATHAN Blancas, 02/02/21, 10:44 AM EST.      Please note that portions of this note may have been completed with a voice recognition program. Efforts were made to edit the dictations, but occasionally words are mistranscribed.

## 2021-06-01 ENCOUNTER — OFFICE VISIT (OUTPATIENT)
Dept: CARDIOLOGY | Facility: CLINIC | Age: 66
End: 2021-06-01

## 2021-06-01 VITALS
OXYGEN SATURATION: 97 % | WEIGHT: 172 LBS | HEART RATE: 63 BPM | SYSTOLIC BLOOD PRESSURE: 158 MMHG | HEIGHT: 68 IN | BODY MASS INDEX: 26.07 KG/M2 | DIASTOLIC BLOOD PRESSURE: 97 MMHG

## 2021-06-01 DIAGNOSIS — I10 ESSENTIAL HYPERTENSION: ICD-10-CM

## 2021-06-01 DIAGNOSIS — I48.21 PERMANENT ATRIAL FIBRILLATION (HCC): ICD-10-CM

## 2021-06-01 DIAGNOSIS — R42 DIZZINESS: ICD-10-CM

## 2021-06-01 DIAGNOSIS — Z72.0 TOBACCO ABUSE: ICD-10-CM

## 2021-06-01 DIAGNOSIS — E78.2 MIXED HYPERLIPIDEMIA: ICD-10-CM

## 2021-06-01 DIAGNOSIS — I25.10 CORONARY ARTERY DISEASE INVOLVING NATIVE CORONARY ARTERY OF NATIVE HEART WITHOUT ANGINA PECTORIS: Primary | ICD-10-CM

## 2021-06-01 PROCEDURE — 99214 OFFICE O/P EST MOD 30 MIN: CPT | Performed by: INTERNAL MEDICINE

## 2021-06-01 NOTE — PROGRESS NOTES
[facility] Cardiology    Encounter Date: 2021    Patient ID: Eliud Crouch is a 66 y.o. male.  : 1955     PCP: Jimmie Alonso MD       Chief Complaint: Coronary Artery Disease      PROBLEM LIST:  1. Coronary artery disease:  a. Remote septal MI.  b. Previous stent to the RCA.  c. [facility], 2003, Dr. Enrrique Morse: Patent RCA stent, normal LV.  d. [facility], 10/31/2012: EF 55-60%, mild MR, nonobstructive disease involving multiple vessels.  e. [facility], 2015: Nonobstructive plaque/disease that involved multiple vessels. EF 35%.  f. [facility], 2019: Nonobstructive CAD involving multiple vessels without hemodynamically significant disease. EF 55%.  g. Echocardiogram, 2020: Normal EF. Atrial fibrillation noted.   2. Paroxysmal atrial fibrillation:  a. CHADS-VASc score = 3 (HTN, Age 65-74, CAD)  b. PIPER/ECV to SR, 2015.  c. PIPER, 2019: EF 55%. Left atrial cavity is moderately dilated. Mild MR/TR. No intracardial thrombus or mass, clear DACIA.  d. ECV, 2019: Successful cardioversion.  e. Holter monitor, : Atrial fibrillation with average rate of 71. Occasional PVC's and rare short NSVT. No significant pauses.   3. Dilated cardiomyopathy:  a. PIPER, 2015: DCM with EF 40%.  b. PIPER, 2019: EF 55%  4. Hypertension.  5. Hyperlipidemia.  6. Carotid plaque:  a. Carotid duplex, 10/2012: Minimal plaque, normal velocities.  b. Carotid US, 2021: No hemodynamically significant, clinically relevant carotid US findings are noted.   7. Tobacco use:  a. Smokes 2 PPD per 2019 visit.  8. GERD.  9. Osteoarthritis.  10. Chronic neck and back pain.    History of Present Illness  Patient presents today for a follow-up with a history of coronary artery disease, cardiomyopathy, permanent atrial fibrillation, CVA, and cardiac risk factors. Since last visit, he has been feeling well overall from a cardiovascular standpoint. He occasionally experiences  dizziness. The dizziness worsens when he turns his head from left to right and vice versa. He followed up with his primary care provider and wore a heart monitor, which showed an average rate of 71 bpm. He was started on a new medication and it has improved his dizziness. Patient denies chest pain, shortness of breath, palpitations, edema, and syncope. The patient still smoked a few packs of cigarettes per day.       Allergies   Allergen Reactions   • Morphine Anxiety         Current Outpatient Medications:   •  albuterol sulfate  (90 Base) MCG/ACT inhaler, Inhale 2 puffs Every 4 (Four) Hours As Needed for Wheezing., Disp: , Rfl:   •  apixaban (ELIQUIS) 5 MG tablet tablet, Take 5 mg by mouth 2 (Two) Times a Day., Disp: , Rfl:   •  aspirin 81 MG EC tablet, Take 81 mg by mouth Daily., Disp: , Rfl:   •  atorvastatin (LIPITOR) 40 MG tablet, Take 1 tablet by mouth Daily., Disp: 90 tablet, Rfl: 3  •  labetalol (NORMODYNE) 300 MG tablet, Take 300 mg by mouth 2 (Two) Times a Day., Disp: , Rfl:   •  oxyCODONE-acetaminophen (PERCOCET)  MG per tablet, Take 1 tablet by mouth Every 6 (Six) Hours As Needed for Moderate Pain ., Disp: , Rfl:   •  pantoprazole (PROTONIX) 40 MG EC tablet, Take 40 mg by mouth Daily., Disp: , Rfl:     The following portions of the patient's history were reviewed and updated as appropriate: allergies, current medications, past family history, past medical history, past social history, past surgical history and problem list.    ROS  Review of Systems   Constitution: Negative for chills, fever, fatigue, generalized weakness.   Cardiovascular: Negative for chest pain, dyspnea on exertion, leg swelling, palpitations, orthopnea, and syncope.   Respiratory: Negative for cough, shortness of breath, and wheezing.  HENT: Negative for ear pain, nosebleeds, and tinnitus.  Gastrointestinal: Negative for abdominal pain, constipation, diarrhea, nausea and vomiting.   Genitourinary: No urinary  "symptoms.  Musculoskeletal: Negative for muscle cramps.  Neurological: Negative for headaches, loss of balance, numbness, and symptoms of stroke. Positive for dizziness.  Psychiatric: Normal mental status.     All other systems reviewed and are negative.        Objective:     /97 (BP Location: Left arm, Patient Position: Sitting)   Pulse 63   Ht 172.7 cm (68\")   Wt 78 kg (172 lb)   SpO2 97%   BMI 26.15 kg/m²    Repeat BP: 140/80    Physical Exam  Constitutional: Patient appears well-developed and well-nourished.   HENT: HEENT exam unremarkable.   Neck: Neck supple. No JVD present. No carotid bruits.   Cardiovascular: Normal rate, regular rhythm and normal heart sounds. No murmur heard.   2+ symmetric pulses.   Pulmonary/Chest: Breath sounds normal. Does not exhibit tenderness.   Abdominal: Abdomen benign.   Musculoskeletal: Does not exhibit edema.   Neurological: Neurological exam unremarkable.   Vitals reviewed.    Data Review:   Lab date: 11/22/2020  · BMP: Glu 126, BUN 15, Creat 0.59, eGFR 137, Na 139, K 5, Cl 108, CO2 25, Ca 8.6  · CBC: WBC 13.4, RBC 4.23, HGB 12.8, HCT 39.5, MCV 93.5, MCH 30.3,     Lab Results   Component Value Date    CHOL 119 05/20/2019    TRIG 72 05/20/2019    HDL 34 (L) 05/20/2019    LDL 71 05/20/2019         Procedures       Assessment:      Diagnosis Plan   1. Coronary artery disease involving native coronary artery of native heart without angina pectoris  Stable with no current angina; continue aspirin 81 mg daily for antiplatelet therapy.   2. Permanent atrial fibrillation (CMS/HCC)  Stable; continue labetalol 300 mg BID and Eliquis 5 mg BID.    3. Dizziness  Heart monitor showed no signs of bradycardia or pauses. Dizziness most likely related to vertigo.   4. Essential hypertension  Acceptable control; continue labetalol 300 mg BID.    5. Mixed hyperlipidemia  Well-controlled; continue atorvastatin 40 mg daily.   6. Tobacco abuse  Smoking cessation strongly " recommended. Patient counseled for >3 minutes.      Plan:   Stable cardiac status.   Continue current medications.   FU in 6 MO, sooner as needed.  Thank you for allowing us to participate in the care of your patient.     Scribed for Rick Whitten MD by Marilee Guajardo. 6/1/2021  09:33 EDT      I, Rick Whitten MD, personally performed the services described in this documentation as scribed by the above named individual in my presence, and it is both accurate and complete.  6/2/2021  08:01 EDT        Please note that portions of this note may have been completed with a voice recognition program. Efforts were made to edit the dictations, but occasionally words are mistranscribed.

## 2022-01-17 NOTE — PROGRESS NOTES
[facility] Cardiology    Encounter Date: 2022    Patient ID: Eliud Crouch is a 66 y.o. male.  : 1955     PCP: Jimmie Alonso MD       Chief Complaint: Coronary artery disease involving native coronary artery of       PROBLEM LIST:  1. Coronary artery disease:  a. Remote septal MI.  b. Previous stent to the RCA.  c. [facility], 2003, Dr. Enrrique Morse: Patent RCA stent, normal LV.  d. [facility], 10/31/2012: EF 55-60%, mild MR, nonobstructive disease involving multiple vessels.  e. [facility], 2015: Nonobstructive plaque/disease that involved multiple vessels. EF 35%.  f. [facility], 2019: Nonobstructive CAD involving multiple vessels without hemodynamically significant disease. EF 55%.  g. Echocardiogram, 2020: Normal EF. Atrial fibrillation noted.   2. Paroxysmal atrial fibrillation:  a. CHADS-VASc score = 3 (HTN, Age 65-74, CAD)  b. PIPER/ECV to SR, 2015.  c. PIPER, 2019: EF 55%. Left atrial cavity is moderately dilated. Mild MR/TR. No intracardial thrombus or mass, clear DACIA.  d. ECV, 2019: Successful cardioversion.  e. Holter monitor, 2021: Atrial fibrillation with average rate of 71. Occasional PVC's and rare short NSVT. No significant pauses.   3. Dilated cardiomyopathy:  a. PIPER, 2015: DCM with EF 40%.  b. PIPER, 2019: EF 55%  4. Hypertension.  5. Hyperlipidemia.  6. Carotid plaque:  a. Carotid duplex, 10/2012: Minimal plaque, normal velocities.  b. Carotid US, 2021: No hemodynamically significant, clinically relevant carotid US findings are noted.   7. Tobacco use:  a. Smokes 2 PPD per 2019 visit.  8. GERD.  9. Osteoarthritis.  10. Chronic neck and back pain.    History of Present Illness  Patient presents today for a 6 month follow-up with a history of coronary artery disease, permanent atrial fibrillation, dizziness, and cardiac risk factors. Since last visit, patient reports heart fluttering/racing and chest  "tightness/burning. He was recently seen by his PCP who ordered a stress test and echocardiogram due to an abnormal blood test report patient did not know what was abnormal and his wife said \"his was 160 and it was supposed to be 100\". These tests will be done next month.  On review of labs it turns out that his BNP level was borderline elevated at 162.  He is quite sedentary and just participates in minor household activities.  Patient denies shortness of breath, edema, dizziness, and syncope.       Allergies   Allergen Reactions   • Morphine Anxiety         Current Outpatient Medications:   •  albuterol sulfate  (90 Base) MCG/ACT inhaler, Inhale 2 puffs Every 4 (Four) Hours As Needed for Wheezing., Disp: , Rfl:   •  apixaban (ELIQUIS) 5 MG tablet tablet, Take 5 mg by mouth Daily. Once daily per patient, Disp: , Rfl:   •  aspirin 81 MG EC tablet, Take 81 mg by mouth Daily., Disp: , Rfl:   •  labetalol (NORMODYNE) 300 MG tablet, Take 300 mg by mouth As Needed. As needed per patient., Disp: , Rfl:   •  oxyCODONE-acetaminophen (PERCOCET)  MG per tablet, Take 1 tablet by mouth Every 6 (Six) Hours As Needed for Moderate Pain ., Disp: , Rfl:   •  pantoprazole (PROTONIX) 40 MG EC tablet, Take 40 mg by mouth Daily., Disp: , Rfl:   •  atorvastatin (LIPITOR) 40 MG tablet, Take 1 tablet by mouth Daily., Disp: 90 tablet, Rfl: 3    The following portions of the patient's history were reviewed and updated as appropriate: allergies, current medications, past family history, past medical history, past social history, past surgical history and problem list.    ROS  Review of Systems   Constitution: Negative for chills, fever, fatigue, generalized weakness.   Cardiovascular: Negative for dyspnea on exertion, leg swelling, orthopnea, and syncope. Positive for palpitations and chest pain   Respiratory: Negative for cough, shortness of breath, and wheezing.  HENT: Negative for ear pain, nosebleeds, and " "tinnitus.  Gastrointestinal: Negative for abdominal pain, constipation, diarrhea, nausea and vomiting.   Genitourinary: No urinary symptoms.  Musculoskeletal: Negative for muscle cramps.  Neurological: Negative for dizziness, headaches, loss of balance, numbness, and symptoms of stroke.  Psychiatric: Normal mental status.     All other systems reviewed and are negative.        Objective:     /87 (BP Location: Left arm, Patient Position: Sitting, Cuff Size: Adult)   Pulse 85   Ht 172.7 cm (68\")   Wt 76.2 kg (168 lb)   SpO2 97%   BMI 25.54 kg/m²      Physical Exam  Constitutional: Patient appears well-developed and well-nourished.   HENT: HEENT exam unremarkable.   Neck: Neck supple. No JVD present. No carotid bruits.   Cardiovascular: Normal rate, regular rhythm and normal heart sounds. No murmur heard.   2+ symmetric pulses.   Pulmonary/Chest: Breath sounds normal. Does not exhibit tenderness.   Abdominal: Abdomen benign.   Musculoskeletal: Does not exhibit edema.   Neurological: Neurological exam unremarkable.   Vitals reviewed.    Data Review:     Lab date: 1/12/2022  • CMP: Glu 150, BUN 8, Creat 0.79, eGFR 98, Na 144, K 3.8, Cl 104, CO2 30, Ca 8.9, Alk Phos 71, AST 17, ALT 10  • CBC: WBC 7.7, RBC 4.94, HGB 13.8, HCT 43.2, MCV 87.5, MCH 28,        Procedures       Assessment:      Diagnosis Plan   1. Coronary artery disease involving native coronary artery of native heart with other form of angina pectoris (HCC)  Reports CP and has stress test and echo scheduled for next month. Continue aspirin 81 mg    2. Permanent atrial fibrillation (HCC)  Reports heart fluttering/racing; upcoming stress test and echo. Continue labetalol 300 mg and Eliquis 5 mg    3. Essential hypertension  Well controlled   4. Mixed hyperlipidemia  Monitored by PCP; continue atorvastatin 40 mg      Plan:   Once echocardiogram and stress test are done will review and contact patient if there are abnormalities.   Overall " cardiac condition is stable, he does not have significant angina or CHF type symptoms.  Continue current medications.   FU in 3 MO, sooner as needed.  Thank you for allowing us to participate in the care of your patient.     Scribed for Rick Whitten MD by Ellen Cameron. 1/18/2022 13:19 EST      I, Rick Whitten MD, personally performed the services described in this documentation as scribed by the above named individual in my presence, and it is both accurate and complete.  1/18/2022  13:49 EST        Part of this note may be an electronic transcription/translation of spoken language to printed text using the Dragon Dictation System.

## 2022-01-18 ENCOUNTER — OFFICE VISIT (OUTPATIENT)
Dept: CARDIOLOGY | Facility: CLINIC | Age: 67
End: 2022-01-18

## 2022-01-18 VITALS
SYSTOLIC BLOOD PRESSURE: 134 MMHG | WEIGHT: 168 LBS | BODY MASS INDEX: 25.46 KG/M2 | OXYGEN SATURATION: 97 % | HEART RATE: 85 BPM | DIASTOLIC BLOOD PRESSURE: 87 MMHG | HEIGHT: 68 IN

## 2022-01-18 DIAGNOSIS — I48.21 PERMANENT ATRIAL FIBRILLATION: ICD-10-CM

## 2022-01-18 DIAGNOSIS — E78.2 MIXED HYPERLIPIDEMIA: ICD-10-CM

## 2022-01-18 DIAGNOSIS — I25.118 CORONARY ARTERY DISEASE INVOLVING NATIVE CORONARY ARTERY OF NATIVE HEART WITH OTHER FORM OF ANGINA PECTORIS: Primary | ICD-10-CM

## 2022-01-18 DIAGNOSIS — I10 ESSENTIAL HYPERTENSION: ICD-10-CM

## 2022-01-18 PROCEDURE — 99214 OFFICE O/P EST MOD 30 MIN: CPT | Performed by: INTERNAL MEDICINE

## 2022-03-21 ENCOUNTER — HOSPITAL ENCOUNTER (EMERGENCY)
Facility: HOSPITAL | Age: 67
Discharge: HOME | End: 2022-03-21
Attending: EMERGENCY MEDICINE | Admitting: EMERGENCY MEDICINE

## 2022-03-21 ENCOUNTER — APPOINTMENT (OUTPATIENT)
Dept: CT IMAGING | Facility: HOSPITAL | Age: 67
End: 2022-03-21

## 2022-03-21 VITALS
SYSTOLIC BLOOD PRESSURE: 122 MMHG | RESPIRATION RATE: 16 BRPM | OXYGEN SATURATION: 94 % | DIASTOLIC BLOOD PRESSURE: 84 MMHG | TEMPERATURE: 97.7 F | HEART RATE: 90 BPM | BODY MASS INDEX: 25.85 KG/M2 | HEIGHT: 68 IN | WEIGHT: 170.6 LBS

## 2022-03-21 DIAGNOSIS — R19.4 DECREASED STOOLING: ICD-10-CM

## 2022-03-21 DIAGNOSIS — R10.84 GENERALIZED ABDOMINAL PAIN: Primary | ICD-10-CM

## 2022-03-21 LAB
ALBUMIN SERPL-MCNC: 3.7 G/DL (ref 3.5–5.2)
ALBUMIN/GLOB SERPL: 1.7 G/DL
ALP SERPL-CCNC: 84 U/L (ref 39–117)
ALT SERPL W P-5'-P-CCNC: <5 U/L (ref 1–41)
ANION GAP SERPL CALCULATED.3IONS-SCNC: 10.1 MMOL/L (ref 5–15)
AST SERPL-CCNC: 11 U/L (ref 1–40)
BASOPHILS # BLD AUTO: 0.07 10*3/MM3 (ref 0–0.2)
BASOPHILS NFR BLD AUTO: 0.9 % (ref 0–1.5)
BILIRUB SERPL-MCNC: 0.4 MG/DL (ref 0–1.2)
BUN SERPL-MCNC: 9 MG/DL (ref 8–23)
BUN/CREAT SERPL: 12.7 (ref 7–25)
CALCIUM SPEC-SCNC: 8.5 MG/DL (ref 8.6–10.5)
CHLORIDE SERPL-SCNC: 105 MMOL/L (ref 98–107)
CO2 SERPL-SCNC: 26.9 MMOL/L (ref 22–29)
CREAT SERPL-MCNC: 0.71 MG/DL (ref 0.76–1.27)
DEPRECATED RDW RBC AUTO: 46.9 FL (ref 37–54)
EGFRCR SERPLBLD CKD-EPI 2021: 99.9 ML/MIN/1.73
EOSINOPHIL # BLD AUTO: 0.09 10*3/MM3 (ref 0–0.4)
EOSINOPHIL NFR BLD AUTO: 1.1 % (ref 0.3–6.2)
ERYTHROCYTE [DISTWIDTH] IN BLOOD BY AUTOMATED COUNT: 14.4 % (ref 12.3–15.4)
GLOBULIN UR ELPH-MCNC: 2.2 GM/DL
GLUCOSE SERPL-MCNC: 103 MG/DL (ref 65–99)
HCT VFR BLD AUTO: 40 % (ref 37.5–51)
HGB BLD-MCNC: 12.9 G/DL (ref 13–17.7)
IMM GRANULOCYTES # BLD AUTO: 0.02 10*3/MM3 (ref 0–0.05)
IMM GRANULOCYTES NFR BLD AUTO: 0.2 % (ref 0–0.5)
LYMPHOCYTES # BLD AUTO: 1.79 10*3/MM3 (ref 0.7–3.1)
LYMPHOCYTES NFR BLD AUTO: 22.1 % (ref 19.6–45.3)
MCH RBC QN AUTO: 28.5 PG (ref 26.6–33)
MCHC RBC AUTO-ENTMCNC: 32.3 G/DL (ref 31.5–35.7)
MCV RBC AUTO: 88.3 FL (ref 79–97)
MONOCYTES # BLD AUTO: 0.61 10*3/MM3 (ref 0.1–0.9)
MONOCYTES NFR BLD AUTO: 7.5 % (ref 5–12)
NEUTROPHILS NFR BLD AUTO: 5.51 10*3/MM3 (ref 1.7–7)
NEUTROPHILS NFR BLD AUTO: 68.2 % (ref 42.7–76)
NRBC BLD AUTO-RTO: 0 /100 WBC (ref 0–0.2)
PLATELET # BLD AUTO: 248 10*3/MM3 (ref 140–450)
PMV BLD AUTO: 10.3 FL (ref 6–12)
POTASSIUM SERPL-SCNC: 3.7 MMOL/L (ref 3.5–5.2)
PROT SERPL-MCNC: 5.9 G/DL (ref 6–8.5)
RBC # BLD AUTO: 4.53 10*6/MM3 (ref 4.14–5.8)
SODIUM SERPL-SCNC: 142 MMOL/L (ref 136–145)
WBC NRBC COR # BLD: 8.09 10*3/MM3 (ref 3.4–10.8)

## 2022-03-21 PROCEDURE — 99283 EMERGENCY DEPT VISIT LOW MDM: CPT

## 2022-03-21 PROCEDURE — 25010000002 IOPAMIDOL 61 % SOLUTION: Performed by: EMERGENCY MEDICINE

## 2022-03-21 PROCEDURE — 74177 CT ABD & PELVIS W/CONTRAST: CPT

## 2022-03-21 PROCEDURE — 80053 COMPREHEN METABOLIC PANEL: CPT | Performed by: PHYSICIAN ASSISTANT

## 2022-03-21 PROCEDURE — 85025 COMPLETE CBC W/AUTO DIFF WBC: CPT | Performed by: PHYSICIAN ASSISTANT

## 2022-03-21 RX ORDER — SODIUM CHLORIDE 0.9 % (FLUSH) 0.9 %
10 SYRINGE (ML) INJECTION AS NEEDED
Status: DISCONTINUED | OUTPATIENT
Start: 2022-03-21 | End: 2022-03-21 | Stop reason: HOSPADM

## 2022-03-21 RX ADMIN — IOPAMIDOL 100 ML: 612 INJECTION, SOLUTION INTRAVENOUS at 12:15

## 2022-03-28 ENCOUNTER — OFFICE VISIT (OUTPATIENT)
Dept: SURGERY | Facility: CLINIC | Age: 67
End: 2022-03-28

## 2022-03-28 VITALS
HEART RATE: 75 BPM | SYSTOLIC BLOOD PRESSURE: 122 MMHG | OXYGEN SATURATION: 98 % | BODY MASS INDEX: 25.01 KG/M2 | HEIGHT: 68 IN | DIASTOLIC BLOOD PRESSURE: 86 MMHG | WEIGHT: 165 LBS | RESPIRATION RATE: 18 BRPM

## 2022-03-28 DIAGNOSIS — Z12.11 SCREENING FOR COLON CANCER: ICD-10-CM

## 2022-03-28 DIAGNOSIS — R10.84 GENERALIZED ABDOMINAL PAIN: ICD-10-CM

## 2022-03-28 DIAGNOSIS — K59.00 CONSTIPATION, UNSPECIFIED CONSTIPATION TYPE: Primary | ICD-10-CM

## 2022-03-28 PROCEDURE — 99204 OFFICE O/P NEW MOD 45 MIN: CPT | Performed by: SURGERY

## 2022-03-28 RX ORDER — DICYCLOMINE HYDROCHLORIDE 10 MG/1
20 CAPSULE ORAL
Qty: 56 CAPSULE | Refills: 1 | Status: SHIPPED | OUTPATIENT
Start: 2022-03-28 | End: 2022-04-11

## 2022-03-28 RX ORDER — EZETIMIBE 10 MG/1
10 TABLET ORAL DAILY
COMMUNITY
Start: 2022-01-28

## 2022-03-28 RX ORDER — DICYCLOMINE HYDROCHLORIDE 10 MG/1
10 CAPSULE ORAL
Qty: 56 CAPSULE | Refills: 1 | Status: SHIPPED | OUTPATIENT
Start: 2022-03-28 | End: 2022-03-28
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
## 2022-05-02 NOTE — PROGRESS NOTES
[facility] Cardiology    Encounter Date: 2022    Patient ID: Eliud Crouch is a 66 y.o. male.  : 1955     PCP: Jimmie Alonso MD       Chief Complaint: Coronary artery disease involving native coronary artery of      PROBLEM LIST:  1. Coronary artery disease:  a. Remote septal MI.  b. Previous stent to the RCA.  c. [facility], 2003, Dr. Enrrique Morse: Patent RCA stent, normal LV.  d. [facility], 10/31/2012: EF 55-60%, mild MR, nonobstructive disease involving multiple vessels.  e. [facility], 2015: Nonobstructive plaque/disease that involved multiple vessels. EF 35%.  f. [facility], 2019: Nonobstructive CAD involving multiple vessels without hemodynamically significant disease. EF 55%.  g. Echocardiogram, 2020: Normal EF. Atrial fibrillation noted.   2. Atrial fibrillation:  a. CHADS-VASc score = 3 (HTN, Age 65-74, CAD)  b. PIPER/ECV to SR, 2015.  c. PIPER, 2019: EF 55%. Left atrial cavity is moderately dilated. Mild MR/TR. No intracardial thrombus or mass, clear DACIA.  d. ECV, 2019: Successful cardioversion.  e. Holter monitor, 2021: Atrial fibrillation with average rate of 71. Occasional PVC's and rare short NSVT. No significant pauses.   f. Echocardiogram 2022: LVEF 70%. Normal echo.  g. NM cardiac stress test 2022: Normal study.  3. Dilated cardiomyopathy:  a. PIEPR, 2015: DCM with EF 40%.  b. PIPER, 2019: EF 55%  4. Hypertension.  5. Hyperlipidemia.  6. Carotid plaque:  a. Carotid duplex, 10/2012: Minimal plaque, normal velocities.  b. Carotid US, 2021: No hemodynamically significant, clinically relevant carotid US findings are noted.   7. Tobacco use:  a. Smokes 2 PPD per 2019 visit.  8. GERD.  9. Osteoarthritis.  10. Chronic neck and back pain.    History of Present Illness  Patient presents today for a 3 month follow-up with a history of coronary artery disease, permanent atrial fibrillation, and cardiac risk  factors. Since last visit, the patient has been doing well overall from a cardiovascular standpoint. He complains of some cramping in his abdomen for the last 3 to 4 months. He reported he had a CT scan preformed and was told he was constipated. He does get some exercise by walking up and down the road. Patient reports he is still smoking. Patient denies chest pain, shortness of breath, orthopnea, palpitations, edema, dizziness, and syncope.       Allergies   Allergen Reactions   • Morphine Anxiety         Current Outpatient Medications:   •  albuterol sulfate  (90 Base) MCG/ACT inhaler, Inhale 2 puffs Every 4 (Four) Hours As Needed for Wheezing., Disp: , Rfl:   •  apixaban (ELIQUIS) 5 MG tablet tablet, Take 5 mg by mouth Daily. Once daily per patient, Disp: , Rfl:   •  aspirin 81 MG EC tablet, Take 81 mg by mouth Daily., Disp: , Rfl:   •  ezetimibe (ZETIA) 10 MG tablet, , Disp: , Rfl:   •  labetalol (NORMODYNE) 300 MG tablet, Take 300 mg by mouth As Needed. As needed per patient., Disp: , Rfl:   •  oxyCODONE-acetaminophen (PERCOCET)  MG per tablet, Take 1 tablet by mouth Every 6 (Six) Hours As Needed for Moderate Pain ., Disp: , Rfl:   •  pantoprazole (PROTONIX) 40 MG EC tablet, Take 40 mg by mouth Daily., Disp: , Rfl:   •  rOPINIRole (REQUIP) 1 MG tablet, Every Night., Disp: , Rfl:   •  rosuvastatin (CRESTOR) 10 MG tablet, Daily., Disp: , Rfl:     The following portions of the patient's history were reviewed and updated as appropriate: allergies, current medications, past family history, past medical history, past social history, past surgical history and problem list.    ROS  Review of Systems   Constitution: Negative for chills, fever, fatigue, generalized weakness.   Cardiovascular: Negative for chest pain, dyspnea on exertion, leg swelling, palpitations, orthopnea, and syncope.   Respiratory: Negative for cough, shortness of breath, and wheezing.  HENT: Negative for ear pain, nosebleeds, and  "tinnitus.  Gastrointestinal: Negative for abdominal pain, constipation, diarrhea, nausea and vomiting.   Genitourinary: No urinary symptoms.  Musculoskeletal: Negative for muscle cramps.  Neurological: Negative for dizziness, headaches, loss of balance, numbness, and symptoms of stroke.  Psychiatric: Normal mental status.     All other systems reviewed and are negative.        Objective:     /72 (BP Location: Right arm, Patient Position: Sitting)   Pulse 88   Ht 172.7 cm (68\")   Wt 75.2 kg (165 lb 12.8 oz)   SpO2 96%   BMI 25.21 kg/m²      Physical Exam  Constitutional: Patient appears well-developed and well-nourished.   HENT: HEENT exam unremarkable.   Neck: Neck supple. No JVD present. No carotid bruits.   Cardiovascular: No murmur heard. Irregularly regular.   2+ symmetric pulses.   Pulmonary/Chest: Breath sounds normal. Does not exhibit tenderness.   Abdominal: Abdomen benign.   Musculoskeletal: Does not exhibit edema.   Neurological: Neurological exam unremarkable.   Vitals reviewed.    Data Review:   Lab Results   Component Value Date    GLUCOSE 103 (H) 03/21/2022    BUN 9 03/21/2022    CREATININE 0.71 (L) 03/21/2022    BCR 12.7 03/21/2022    K 3.7 03/21/2022    CO2 26.9 03/21/2022    CALCIUM 8.5 (L) 03/21/2022    ALBUMIN 3.70 03/21/2022    AST 11 03/21/2022    ALT <5 03/21/2022     Lab Results   Component Value Date    CHOL 119 05/20/2019    TRIG 72 05/20/2019    HDL 34 (L) 05/20/2019    LDL 71 05/20/2019      Lab Results   Component Value Date    WBC 8.09 03/21/2022    RBC 4.53 03/21/2022    HGB 12.9 (L) 03/21/2022    HCT 40.0 03/21/2022    MCV 88.3 03/21/2022     03/21/2022          Procedures           Assessment:      Diagnosis Plan   1. Coronary artery disease involving native coronary artery of native heart with other form of angina pectoris (HCC)  Stable without angina on current activity. Continue on aspirin 81 mg, Eliquis 5 mg,    2. Permanent atrial fibrillation (HCC)  Stable with " no current symptoms, continue Eliquis for anticoagulation and labetalol for rate control.    3. Essential hypertension  Well controlled. Continue on labetalol 300 mg PRN.    4. Mixed hyperlipidemia  Acceptable control. Continue on Zetia 10 mg and rosuvastatin 10 mg.      Plan:   Stable cardiac status.  No current angina or CHF type symptoms.  Diet and exercise recommended.  Strongly encouraged smoking cessation and counseled patient >3 minutes.  Continue current medications.   FU in 6 MO, sooner as needed.  Thank you for allowing us to participate in the care of your patient.     Scribed for Rick Whitten MD by Desiree Armstrong. 5/3/2022 13:30 EDT      I, Rick Whitten MD, personally performed the services described in this documentation as scribed by the above named individual in my presence, and it is both accurate and complete.  5/3/2022  13:34 EDT        Please note that portions of this note may have been completed with a voice recognition program. Efforts were made to edit the dictations, but occasionally words are mistranscribed.

## 2022-05-03 ENCOUNTER — OFFICE VISIT (OUTPATIENT)
Dept: CARDIOLOGY | Facility: CLINIC | Age: 67
End: 2022-05-03

## 2022-05-03 VITALS
DIASTOLIC BLOOD PRESSURE: 72 MMHG | BODY MASS INDEX: 25.13 KG/M2 | WEIGHT: 165.8 LBS | OXYGEN SATURATION: 96 % | HEIGHT: 68 IN | SYSTOLIC BLOOD PRESSURE: 120 MMHG | HEART RATE: 88 BPM

## 2022-05-03 DIAGNOSIS — I25.118 CORONARY ARTERY DISEASE INVOLVING NATIVE CORONARY ARTERY OF NATIVE HEART WITH OTHER FORM OF ANGINA PECTORIS: Primary | ICD-10-CM

## 2022-05-03 DIAGNOSIS — I48.21 PERMANENT ATRIAL FIBRILLATION: ICD-10-CM

## 2022-05-03 DIAGNOSIS — I10 ESSENTIAL HYPERTENSION: ICD-10-CM

## 2022-05-03 DIAGNOSIS — E78.2 MIXED HYPERLIPIDEMIA: ICD-10-CM

## 2022-05-03 PROCEDURE — 99214 OFFICE O/P EST MOD 30 MIN: CPT | Performed by: INTERNAL MEDICINE

## 2022-05-03 RX ORDER — ROPINIROLE 1 MG/1
1 TABLET, FILM COATED ORAL NIGHTLY
COMMUNITY
Start: 2022-04-11

## 2022-05-03 RX ORDER — ROSUVASTATIN CALCIUM 10 MG/1
10 TABLET, COATED ORAL DAILY
COMMUNITY
Start: 2022-04-11

## 2022-08-02 ENCOUNTER — LAB (OUTPATIENT)
Dept: LAB | Facility: HOSPITAL | Age: 67
End: 2022-08-02

## 2022-08-02 ENCOUNTER — TRANSCRIBE ORDERS (OUTPATIENT)
Dept: LAB | Facility: HOSPITAL | Age: 67
End: 2022-08-02

## 2022-08-02 DIAGNOSIS — Z01.818 OTHER SPECIFIED PRE-OPERATIVE EXAMINATION: Primary | ICD-10-CM

## 2022-08-02 DIAGNOSIS — Z01.818 OTHER SPECIFIED PRE-OPERATIVE EXAMINATION: ICD-10-CM

## 2022-08-02 LAB
FLUAV RNA RESP QL NAA+PROBE: NOT DETECTED
FLUBV RNA RESP QL NAA+PROBE: NOT DETECTED
SARS-COV-2 RNA RESP QL NAA+PROBE: NOT DETECTED

## 2022-08-02 PROCEDURE — C9803 HOPD COVID-19 SPEC COLLECT: HCPCS

## 2022-08-02 PROCEDURE — 87636 SARSCOV2 & INF A&B AMP PRB: CPT

## 2023-03-20 ENCOUNTER — APPOINTMENT (OUTPATIENT)
Dept: CARDIOLOGY | Facility: HOSPITAL | Age: 68
DRG: 247 | End: 2023-03-20
Payer: MEDICARE

## 2023-03-20 ENCOUNTER — HOSPITAL ENCOUNTER (INPATIENT)
Facility: HOSPITAL | Age: 68
LOS: 1 days | Discharge: HOME | DRG: 247 | End: 2023-03-21
Attending: INTERNAL MEDICINE | Admitting: INTERNAL MEDICINE
Payer: MEDICARE

## 2023-03-20 DIAGNOSIS — I20.9 ANGINA PECTORIS: ICD-10-CM

## 2023-03-20 PROCEDURE — 93458 L HRT ARTERY/VENTRICLE ANGIO: CPT | Performed by: INTERNAL MEDICINE

## 2023-03-20 PROCEDURE — C1887 CATHETER, GUIDING: HCPCS | Performed by: INTERNAL MEDICINE

## 2023-03-20 PROCEDURE — 25010000002 DIGOXIN PER 500 MCG: Performed by: INTERNAL MEDICINE

## 2023-03-20 PROCEDURE — 25510000001 IOPAMIDOL PER 1 ML: Performed by: INTERNAL MEDICINE

## 2023-03-20 PROCEDURE — B2151ZZ FLUOROSCOPY OF LEFT HEART USING LOW OSMOLAR CONTRAST: ICD-10-PCS | Performed by: INTERNAL MEDICINE

## 2023-03-20 PROCEDURE — C1894 INTRO/SHEATH, NON-LASER: HCPCS | Performed by: INTERNAL MEDICINE

## 2023-03-20 PROCEDURE — 93005 ELECTROCARDIOGRAM TRACING: CPT | Performed by: INTERNAL MEDICINE

## 2023-03-20 PROCEDURE — 93306 TTE W/DOPPLER COMPLETE: CPT | Performed by: INTERNAL MEDICINE

## 2023-03-20 PROCEDURE — 25010000002 BIVALIRUDIN TRIFLUOROACETATE 250 MG RECONSTITUTED SOLUTION 1 EACH VIAL: Performed by: INTERNAL MEDICINE

## 2023-03-20 PROCEDURE — 99222 1ST HOSP IP/OBS MODERATE 55: CPT | Performed by: INTERNAL MEDICINE

## 2023-03-20 PROCEDURE — 4A023N7 MEASUREMENT OF CARDIAC SAMPLING AND PRESSURE, LEFT HEART, PERCUTANEOUS APPROACH: ICD-10-PCS | Performed by: INTERNAL MEDICINE

## 2023-03-20 PROCEDURE — C1769 GUIDE WIRE: HCPCS | Performed by: INTERNAL MEDICINE

## 2023-03-20 PROCEDURE — C1760 CLOSURE DEV, VASC: HCPCS | Performed by: INTERNAL MEDICINE

## 2023-03-20 PROCEDURE — 92928 PRQ TCAT PLMT NTRAC ST 1 LES: CPT | Performed by: INTERNAL MEDICINE

## 2023-03-20 PROCEDURE — 25010000002 PHENYLEPHRINE 10 MG/ML SOLUTION: Performed by: INTERNAL MEDICINE

## 2023-03-20 PROCEDURE — C1874 STENT, COATED/COV W/DEL SYS: HCPCS | Performed by: INTERNAL MEDICINE

## 2023-03-20 PROCEDURE — B2111ZZ FLUOROSCOPY OF MULTIPLE CORONARY ARTERIES USING LOW OSMOLAR CONTRAST: ICD-10-PCS | Performed by: INTERNAL MEDICINE

## 2023-03-20 PROCEDURE — 25010000002 MIDAZOLAM PER 1 MG: Performed by: INTERNAL MEDICINE

## 2023-03-20 PROCEDURE — C1725 CATH, TRANSLUMIN NON-LASER: HCPCS | Performed by: INTERNAL MEDICINE

## 2023-03-20 PROCEDURE — C9600 PERC DRUG-EL COR STENT SING: HCPCS | Performed by: INTERNAL MEDICINE

## 2023-03-20 PROCEDURE — 027035Z DILATION OF CORONARY ARTERY, ONE ARTERY WITH TWO DRUG-ELUTING INTRALUMINAL DEVICES, PERCUTANEOUS APPROACH: ICD-10-PCS | Performed by: INTERNAL MEDICINE

## 2023-03-20 PROCEDURE — 93306 TTE W/DOPPLER COMPLETE: CPT

## 2023-03-20 DEVICE — XIENCE SKYPOINT™ EVEROLIMUS ELUTING CORONARY STENT SYSTEM 2.50 MM X 23 MM / RAPID-EXCHANGE
Type: IMPLANTABLE DEVICE | Status: FUNCTIONAL
Brand: XIENCE SKYPOINT™

## 2023-03-20 DEVICE — XIENCE SKYPOINT™ EVEROLIMUS ELUTING CORONARY STENT SYSTEM 3.00 MM X 15 MM / RAPID-EXCHANGE
Type: IMPLANTABLE DEVICE | Status: FUNCTIONAL
Brand: XIENCE SKYPOINT™

## 2023-03-20 RX ORDER — CLOPIDOGREL BISULFATE 75 MG/1
TABLET ORAL
Status: DISCONTINUED | OUTPATIENT
Start: 2023-03-20 | End: 2023-03-20 | Stop reason: HOSPADM

## 2023-03-20 RX ORDER — OXYCODONE AND ACETAMINOPHEN 10; 325 MG/1; MG/1
1 TABLET ORAL EVERY 6 HOURS PRN
Status: DISCONTINUED | OUTPATIENT
Start: 2023-03-20 | End: 2023-03-21 | Stop reason: HOSPADM

## 2023-03-20 RX ORDER — DIGOXIN 0.25 MG/ML
250 INJECTION INTRAMUSCULAR; INTRAVENOUS EVERY 4 HOURS
Status: COMPLETED | OUTPATIENT
Start: 2023-03-20 | End: 2023-03-21

## 2023-03-20 RX ORDER — FLUTICASONE PROPIONATE 50 MCG
2 SPRAY, SUSPENSION (ML) NASAL DAILY
COMMUNITY

## 2023-03-20 RX ORDER — ROSUVASTATIN CALCIUM 10 MG/1
10 TABLET, COATED ORAL DAILY
Status: DISCONTINUED | OUTPATIENT
Start: 2023-03-20 | End: 2023-03-21 | Stop reason: HOSPADM

## 2023-03-20 RX ORDER — PREGABALIN 75 MG/1
75 CAPSULE ORAL 2 TIMES DAILY
COMMUNITY

## 2023-03-20 RX ORDER — MECLIZINE HYDROCHLORIDE 25 MG/1
25 TABLET ORAL 3 TIMES DAILY PRN
COMMUNITY

## 2023-03-20 RX ORDER — LIDOCAINE HYDROCHLORIDE 10 MG/ML
INJECTION, SOLUTION EPIDURAL; INFILTRATION; INTRACAUDAL; PERINEURAL
Status: DISCONTINUED | OUTPATIENT
Start: 2023-03-20 | End: 2023-03-20 | Stop reason: HOSPADM

## 2023-03-20 RX ORDER — DICYCLOMINE HYDROCHLORIDE 10 MG/1
10 CAPSULE ORAL
COMMUNITY

## 2023-03-20 RX ORDER — CLOPIDOGREL BISULFATE 75 MG/1
75 TABLET ORAL DAILY
Status: DISCONTINUED | OUTPATIENT
Start: 2023-03-21 | End: 2023-03-21 | Stop reason: HOSPADM

## 2023-03-20 RX ORDER — ACETAMINOPHEN 325 MG/1
650 TABLET ORAL EVERY 4 HOURS PRN
Status: DISCONTINUED | OUTPATIENT
Start: 2023-03-20 | End: 2023-03-21 | Stop reason: HOSPADM

## 2023-03-20 RX ORDER — SODIUM CHLORIDE 9 MG/ML
100 INJECTION, SOLUTION INTRAVENOUS CONTINUOUS
Status: ACTIVE | OUTPATIENT
Start: 2023-03-20 | End: 2023-03-20

## 2023-03-20 RX ORDER — METOPROLOL TARTRATE 50 MG/1
50 TABLET, FILM COATED ORAL EVERY 12 HOURS SCHEDULED
Status: DISCONTINUED | OUTPATIENT
Start: 2023-03-20 | End: 2023-03-21 | Stop reason: HOSPADM

## 2023-03-20 RX ORDER — OXYCODONE AND ACETAMINOPHEN 10; 325 MG/1; MG/1
1 TABLET ORAL ONCE
Status: COMPLETED | OUTPATIENT
Start: 2023-03-20 | End: 2023-03-20

## 2023-03-20 RX ORDER — LEVOCETIRIZINE DIHYDROCHLORIDE 5 MG/1
5 TABLET, FILM COATED ORAL EVERY EVENING
COMMUNITY

## 2023-03-20 RX ORDER — PHENYLEPHRINE HYDROCHLORIDE 10 MG/ML
INJECTION INTRAVENOUS
Status: DISCONTINUED | OUTPATIENT
Start: 2023-03-20 | End: 2023-03-20 | Stop reason: HOSPADM

## 2023-03-20 RX ORDER — ASPIRIN 81 MG/1
81 TABLET ORAL DAILY
Status: DISCONTINUED | OUTPATIENT
Start: 2023-03-20 | End: 2023-03-21 | Stop reason: HOSPADM

## 2023-03-20 RX ORDER — ASPIRIN 325 MG
TABLET ORAL
Status: DISCONTINUED | OUTPATIENT
Start: 2023-03-20 | End: 2023-03-20 | Stop reason: HOSPADM

## 2023-03-20 RX ORDER — MIDAZOLAM HYDROCHLORIDE 1 MG/ML
INJECTION INTRAMUSCULAR; INTRAVENOUS
Status: DISCONTINUED | OUTPATIENT
Start: 2023-03-20 | End: 2023-03-20 | Stop reason: HOSPADM

## 2023-03-20 RX ADMIN — METOPROLOL TARTRATE 50 MG: 50 TABLET, FILM COATED ORAL at 20:39

## 2023-03-20 RX ADMIN — ROSUVASTATIN CALCIUM 10 MG: 10 TABLET, FILM COATED ORAL at 18:48

## 2023-03-20 RX ADMIN — DIGOXIN 250 MCG: 0.25 INJECTION INTRAMUSCULAR; INTRAVENOUS at 18:48

## 2023-03-20 RX ADMIN — OXYCODONE HYDROCHLORIDE AND ACETAMINOPHEN 1 TABLET: 10; 325 TABLET ORAL at 13:17

## 2023-03-20 RX ADMIN — OXYCODONE HYDROCHLORIDE AND ACETAMINOPHEN 1 TABLET: 10; 325 TABLET ORAL at 18:53

## 2023-03-20 RX ADMIN — APIXABAN 5 MG: 5 TABLET, FILM COATED ORAL at 20:39

## 2023-03-20 NOTE — NURSING NOTE
"  ACC REVIEW REPORT: [facility]        PATIENT NAME: Eliud Crouch    PATIENT ID: [id number]        COVID-19 ACC SCREENING       DOES THE PATIENT HAVE A FEVER GREATER THAN OR EQUAL .4:  NO    IS THE PATIENT EXPERIENCING SHORTNESS OF BREATH: WHEN LYING FLAT     DOES THE PATIENT HAVE A COUGH: NO  DOES THE PATIENT HAVE ANY OF THE FOLLOWING RISK FACTORS:    EXPOSURE TO SUSPECTED OR KNOWN COVID-19: NO    RECENT TRAVEL HISTORY TO ENDEMIC AREA (DOMESTIC/LOCAL): NO    IS THE PATIENT A HEALTHCARE WORKER: NO    HAS THE PATIENT EXPERIENCED A LOSS OF SENSE OF TASTE OR SMELL: NO    HAS THE PATIENT BEEN TESTED FOR COVID-19: YES....NEGATIVE     DATE TESTED: 3/18/23        BED:  9802     BED TYPE:  [facility]    BED GIVEN TO: MAURY ORTEGA RN    TIME BED GIVEN: 0455    TODAY'S DATE: 3/20/2023    TRANSFER DATE: 3/20    ETA: PENDING EMS AVAILABILITY, AFTER 7:30AM     TRANSFERRING FACILITY: [facility] FACILITY PHONE # : [phone]    TRANSFERRING MD: KARLENE     DATE/TIME REQUEST RECEIVED: 3/18   1018    [facility] RN: JASMIN HARTN, CCRN,CSC     REPORT FROM: MAURY ORTEGA RN      TIME REPORT TAKEN:3/20  0455    DIAGNOSIS: NSTEMI W CHF     REASON FOR TRANSFER TO [facility]:  HIGHER LEVEL OF CARE, IS ESTABLISHED PT OF DR. BURR     TRANSPORTATION: [facility]     CLINICAL REASON FOR TRANSFER TO [facility]: PT PRESENTED TO ED Friday NIGHT W CHEST PAIN AND                                                                                  SOA.        CLINICAL INFORMATION    HEIGHT: 68\"     WEIGHT: 76.6KG     ALLERGIES: MORPHINE  / PCN     INFECTIOUS DISEASE: NONE     ISOLATION: NONE     VITAL SIGNS:   TIME: 0400  TEMP: 98.7  PULSE: 72 AF, BASELINE  B/P:  92/70 (LOWER THAT HIS USUAL)   RESP: 18 /  O2 SATS 95 ON RA, O2 HAS BEEN OFF > 8 HRS         LAB INFORMATION: ALL ALBS ARE FROM 3/19 AM,   CR = 0.8 / K 3.8 (REPLACED)/  H&H REPORTED AS                                      NORMAL AND ALL OTHER CHEMISTRIES        MEDS/IV " "FLUIDS: 20G LT FA AND 20G RT FA FROM ADMISSION                                   HEP GTT AT 18U/KG/HR  /  LAST DOSE ELIQUIS LAST DOSE 3/18AM /                                     LASIX 40 BID /  OXYCODONE 10/325 Q 6HRS PRN BACK PAIN, (DOES NOT TAKE IT                                    Q 6HRS )       CARDIAC SYSTEM:    CHEST PAIN:  LAST EPISODE 3/19 AT  6AM  (RESOLVED AFTER 1 SL NTG)    RHYTHM: AF 90'S     Is patient taking or has patient been given any drugs that could increase bleeding? HEPARIN GTT  @                                                                                                                                                                  18U/KG/HR                 TROPONIN:    DATE: 3/18  TROP: 38.2     DATE: 3/18  TROP: 29.2    DATE: 3/18  TROP: 36     DATE:  3/19 WITH CP   TROP:  34       HEART CATH: 2019, PT SAYS \" HE GOT A STENT\"         RESPIRATORY STATUS: HAS BEEN GETTING SOB LYING FLAT LATELY, USING EXTRA PILLOW .  RN STATES HE HAS NO EDEMA .         CNS/MUSCULOSKELETAL    ALERT AND ORIENTED: X 4 , CONVERSES WITH FAMILY ON CELL       CNS/MUSCULOSKELETAL NOTES: AMB W/O ASST         GI//GY    ABDOMINAL PAIN:  DENIES     GI//GY NOTES:  Walks to bathroom unasst'd, voids w/o diff         PAST MEDICAL HISTORY: Previous cath /  Permanent AF (ON ELIQUIS)  / New CHF /  HLD / Osteoarthitis /                                                HTN /         ADDITIONAL NOTES: NURSE SAYS HE HAS NO SKIN ISSUES                                            -[facility] ASKED TO SEND DISC OF ANY IMAGING DONE THERE                                             - Wife, Carey can be reached at [phone]          Sonia Ny RN  3/20/2023  04:59 EDT  "

## 2023-03-20 NOTE — H&P
Pre-cardiac Catheterization History and Physical  [facility] at [facility]      Patient:  Eliud Crouch  :  1955  MRN: [id number]    PCP:  Jimmie Alonso MD  PHONE:  [phone]    DATE: 3/20/2023  ID: Eliud Crouch is a 67 y.o. male     CC: NSTEMI    PROBLEM LIST:   1. Coronary artery disease:  a. Remote septal MI.  b. Previous stent to the RCA.  c. [facility], 2003, Dr. Enrrique Morse: Patent RCA stent, normal LV.  d. [facility], 10/31/2012: EF 55-60%, mild MR, nonobstructive disease involving multiple vessels.  e. [facility], 2015: Nonobstructive plaque/disease that involved multiple vessels. EF 35%.  f. C, 2019: Nonobstructive CAD involving multiple vessels without hemodynamically significant disease. EF 55%.  g. Echocardiogram, 2020: Normal EF. Atrial fibrillation noted.   2. Chronic permanent atrial fibrillation:  a. CHADS-VASc score = 3 (HTN, Age 65-74, CAD)  b. PIPER/ECV to SR, 2015.  c. PIPER, 2019: EF 55%. Left atrial cavity is moderately dilated. Mild MR/TR. No intracardial thrombus or mass, clear DACIA.  d. ECV, 2019: Successful cardioversion.  e. Holter monitor, 2021: Atrial fibrillation with average rate of 71. Occasional PVC's and rare short NSVT. No significant pauses.   f. Echocardiogram 2022: LVEF 70%. Normal echo.  g. NM cardiac stress test 2022: Normal study.  3. Dilated cardiomyopathy:  a. PIPER, 2015: DCM with EF 40%.  b. PIPER, 2019: EF 55%  4. Hypertension.  5. Hyperlipidemia.  6. Carotid plaque:  a. Carotid duplex, 10/2012: Minimal plaque, normal velocities.  b. Carotid US, 2021: No hemodynamically significant, clinically relevant carotid US findings are noted.   7. Tobacco use:  a. Smokes 2 PPD per 2019 visit.  8. GERD.  9. Osteoarthritis.  10. Chronic neck and back pain.    BRIEF HPI:  Patient is a 67-year-old male with the above past medical history who presented to [facility] today from an  outside hospital for left heart catheterization.  He initially presented to [facility] on 3/18/2023 for complaints of significant dyspnea on exertion and smothering.  Patient reported that this has been going on for a couple of weeks.  He states he did have an episode of chest pain just prior to this starting.  He has been unable to lie flat or exert himself for several days now.  When he got to [facility] he was found to be in atrial fibrillation with RVR.  His troponin trended up to 36. He was started on heparin drip and transferred to [facility].     Today he states that his shortness of breath has improved but he does continue to feel short of breath and have orthopnea.  He is currently chest pain-free.  He is in atrial fibrillation on telemetry with borderline rate control.    Cardiac Risk Factors: advanced age (older than 55 for men, 65 for women), dyslipidemia, hypertension, male gender, sedentary lifestyle and smoking/ tobacco exposure    Allergies:      Allergies   Allergen Reactions   • Penicillins Other (See Comments)     unknown   • Morphine Anxiety       MEDICATIONS:  Current Outpatient Medications   Medication Instructions   • albuterol sulfate  (90 Base) MCG/ACT inhaler 2 puffs, Inhalation, Every 4 Hours PRN   • apixaban (ELIQUIS) 5 mg, Oral, Daily, Once daily per patient   • aspirin 81 mg, Oral, Daily   • ezetimibe (ZETIA) 10 MG tablet No dose, route, or frequency recorded.   • labetalol (NORMODYNE) 300 mg, Oral, As Needed, As needed per patient.   • oxyCODONE-acetaminophen (PERCOCET)  MG per tablet 1 tablet, Oral, Every 6 Hours PRN   • pantoprazole (PROTONIX) 40 mg, Oral, Daily   • rOPINIRole (REQUIP) 1 MG tablet Nightly   • rosuvastatin (CRESTOR) 10 MG tablet Daily       Past medical & surgical history, social and family history reviewed in the electronic medical record.    ROS: Pertinent positives listed in the HPI and problem list above. All others reviewed  and negative.     Physical Exam:   There were no vitals taken for this visit.    Constitutional:    Alert, cooperative, in no acute distress   Neck:     No Jugular venous distention, adenopathy, or thyromegaly noted.    Heart:    Regular rhythm and normal rate, normal S1 and S2, no murmurs,gallops, rubs, or clicks. No distinct PMI noted.    Lungs:     Clear to auscultation bilaterally, respirations regular, even and unlabored    Abdomen:     Soft nontender, nondistended, normal bowel sounds   Extremities:   No gross deformities, no edema, clubbing, or cyanosis.    Pulses:   Peripheral pulses palpable and equal bilaterally.     Barbaeu Test:  Left: Abnormal  (oxymetric Allens) Right: Abnormal:     Labs and Diagnostic Data:  Lab review 3/20/2023:   CMP: , BUN 25, creatinine 0.65, , K3.7, , CO2 31  CBC: WBC 16.3, Hgb 13.7, HCT 42.2,   Troponin: 32.8, 29.7, 36, 34, 22.3  Lactic acid 3/18/2023: 2.1      EKG/Tele: Atrial fibrillation, borderline ventricular response    IMPRESSION:  · NSTEMI   · Presented to OSH with features of unstable angina and found with initial troponin which trended up to 36.  · On heparin drip.  · Atrial fibrillation, borderline ventricular response  · Only taking Eliquis once daily instead of twice daily.  Has been on heparin drip since 3/18/2023    PLAN:  · Procedure to perform: LHC +/- CBI. Risks, benefits and alternatives to the procedure explained to the patient and he understands and wishes to proceed.   · Will need echocardiogram post procedure.      Paty Brown PA-C    I have seen and examined the patient, case was discussed with the physician extender, reviewed the above note, necessary changes were made and I agree with the final note.   Rick Whitten MD, FAC, [facility]

## 2023-03-21 ENCOUNTER — READMISSION MANAGEMENT (OUTPATIENT)
Dept: CALL CENTER | Facility: HOSPITAL | Age: 68
End: 2023-03-21
Payer: MEDICARE

## 2023-03-21 VITALS
HEART RATE: 76 BPM | BODY MASS INDEX: 23.69 KG/M2 | DIASTOLIC BLOOD PRESSURE: 88 MMHG | OXYGEN SATURATION: 92 % | TEMPERATURE: 99.1 F | WEIGHT: 150.9 LBS | SYSTOLIC BLOOD PRESSURE: 129 MMHG | HEIGHT: 67 IN | RESPIRATION RATE: 16 BRPM

## 2023-03-21 PROBLEM — I50.22 CHRONIC SYSTOLIC HEART FAILURE: Status: ACTIVE | Noted: 2023-03-21

## 2023-03-21 LAB
ANION GAP SERPL CALCULATED.3IONS-SCNC: 8 MMOL/L (ref 5–15)
BH CV ECHO MEAS - AO MAX PG: 3.5 MMHG
BH CV ECHO MEAS - AO MEAN PG: 2.22 MMHG
BH CV ECHO MEAS - AO ROOT DIAM: 3 CM
BH CV ECHO MEAS - AO V2 MAX: 93.9 CM/SEC
BH CV ECHO MEAS - AO V2 VTI: 13.7 CM
BH CV ECHO MEAS - AVA(I,D): 2.5 CM2
BH CV ECHO MEAS - EDV(CUBED): 84.2 ML
BH CV ECHO MEAS - EDV(MOD-SP2): 63.4 ML
BH CV ECHO MEAS - EDV(MOD-SP4): 98.1 ML
BH CV ECHO MEAS - EF(MOD-BP): 56.3 %
BH CV ECHO MEAS - EF(MOD-SP2): 53.5 %
BH CV ECHO MEAS - EF(MOD-SP4): 58.6 %
BH CV ECHO MEAS - ESV(CUBED): 26.5 ML
BH CV ECHO MEAS - ESV(MOD-SP2): 29.5 ML
BH CV ECHO MEAS - ESV(MOD-SP4): 40.6 ML
BH CV ECHO MEAS - FS: 32 %
BH CV ECHO MEAS - IVS/LVPW: 1.01 CM
BH CV ECHO MEAS - IVSD: 1.03 CM
BH CV ECHO MEAS - LA DIMENSION: 4.5 CM
BH CV ECHO MEAS - LAT PEAK E' VEL: 8.7 CM/SEC
BH CV ECHO MEAS - LV MASS(C)D: 150.9 GRAMS
BH CV ECHO MEAS - LV MAX PG: 2.07 MMHG
BH CV ECHO MEAS - LV MEAN PG: 0.86 MMHG
BH CV ECHO MEAS - LV V1 MAX: 72 CM/SEC
BH CV ECHO MEAS - LV V1 VTI: 10.9 CM
BH CV ECHO MEAS - LVIDD: 4.4 CM
BH CV ECHO MEAS - LVIDS: 3 CM
BH CV ECHO MEAS - LVOT AREA: 3.2 CM2
BH CV ECHO MEAS - LVOT DIAM: 2 CM
BH CV ECHO MEAS - LVPWD: 1.01 CM
BH CV ECHO MEAS - MED PEAK E' VEL: 7.6 CM/SEC
BH CV ECHO MEAS - MV DEC SLOPE: 609.6 CM/SEC2
BH CV ECHO MEAS - MV DEC TIME: 0.2 MSEC
BH CV ECHO MEAS - MV E MAX VEL: 65.1 CM/SEC
BH CV ECHO MEAS - MV MAX PG: 3.5 MMHG
BH CV ECHO MEAS - MV MEAN PG: 1.65 MMHG
BH CV ECHO MEAS - MV P1/2T: 45 MSEC
BH CV ECHO MEAS - MV V2 VTI: 14.1 CM
BH CV ECHO MEAS - MVA(P1/2T): 4.9 CM2
BH CV ECHO MEAS - MVA(VTI): 2.46 CM2
BH CV ECHO MEAS - PA ACC TIME: 0.06 SEC
BH CV ECHO MEAS - PA PR(ACCEL): 53.6 MMHG
BH CV ECHO MEAS - SV(LVOT): 34.5 ML
BH CV ECHO MEAS - SV(MOD-SP2): 33.9 ML
BH CV ECHO MEAS - SV(MOD-SP4): 57.5 ML
BH CV ECHO MEAS - TAPSE (>1.6): 2.9 CM
BH CV ECHO MEAS - TR MAX PG: 18.2 MMHG
BH CV ECHO MEAS - TR MAX VEL: 213.6 CM/SEC
BH CV ECHO MEASUREMENTS AVERAGE E/E' RATIO: 7.99
BH CV XLRA - RV BASE: 3.7 CM
BH CV XLRA - RV LENGTH: 5.9 CM
BH CV XLRA - RV MID: 2.9 CM
BH CV XLRA - TDI S': 14.4 CM/SEC
BUN SERPL-MCNC: 15 MG/DL (ref 8–23)
BUN/CREAT SERPL: 20 (ref 7–25)
CALCIUM SPEC-SCNC: 8.7 MG/DL (ref 8.6–10.5)
CHLORIDE SERPL-SCNC: 100 MMOL/L (ref 98–107)
CO2 SERPL-SCNC: 29 MMOL/L (ref 22–29)
CREAT SERPL-MCNC: 0.75 MG/DL (ref 0.76–1.27)
DEPRECATED RDW RBC AUTO: 45.8 FL (ref 37–54)
EGFRCR SERPLBLD CKD-EPI 2021: 97.7 ML/MIN/1.73
ERYTHROCYTE [DISTWIDTH] IN BLOOD BY AUTOMATED COUNT: 14.3 % (ref 12.3–15.4)
GLUCOSE SERPL-MCNC: 92 MG/DL (ref 65–99)
HCT VFR BLD AUTO: 42.4 % (ref 37.5–51)
HGB BLD-MCNC: 13.5 G/DL (ref 13–17.7)
LEFT ATRIUM VOLUME INDEX: 39.9 ML/M2
LV EF 2D ECHO EST: 45 %
MAXIMAL PREDICTED HEART RATE: 151 BPM
MCH RBC QN AUTO: 28.1 PG (ref 26.6–33)
MCHC RBC AUTO-ENTMCNC: 31.8 G/DL (ref 31.5–35.7)
MCV RBC AUTO: 88.1 FL (ref 79–97)
PA ADP PRP-ACNC: 89 PRU
PLATELET # BLD AUTO: 241 10*3/MM3 (ref 140–450)
PMV BLD AUTO: 10.9 FL (ref 6–12)
POTASSIUM SERPL-SCNC: 4.6 MMOL/L (ref 3.5–5.2)
RBC # BLD AUTO: 4.81 10*6/MM3 (ref 4.14–5.8)
SODIUM SERPL-SCNC: 137 MMOL/L (ref 136–145)
STRESS TARGET HR: 128 BPM
WBC NRBC COR # BLD: 13.87 10*3/MM3 (ref 3.4–10.8)

## 2023-03-21 PROCEDURE — 85576 BLOOD PLATELET AGGREGATION: CPT | Performed by: INTERNAL MEDICINE

## 2023-03-21 PROCEDURE — 80048 BASIC METABOLIC PNL TOTAL CA: CPT | Performed by: INTERNAL MEDICINE

## 2023-03-21 PROCEDURE — 85027 COMPLETE CBC AUTOMATED: CPT | Performed by: INTERNAL MEDICINE

## 2023-03-21 PROCEDURE — 25010000002 DIGOXIN PER 500 MCG: Performed by: INTERNAL MEDICINE

## 2023-03-21 PROCEDURE — 99238 HOSP IP/OBS DSCHRG MGMT 30/<: CPT | Performed by: INTERNAL MEDICINE

## 2023-03-21 RX ORDER — METOPROLOL TARTRATE 50 MG/1
50 TABLET, FILM COATED ORAL EVERY 12 HOURS SCHEDULED
Qty: 180 TABLET | Refills: 1 | Status: SHIPPED | OUTPATIENT
Start: 2023-03-21

## 2023-03-21 RX ORDER — LOSARTAN POTASSIUM 25 MG/1
25 TABLET ORAL NIGHTLY
Status: DISCONTINUED | OUTPATIENT
Start: 2023-03-21 | End: 2023-03-21 | Stop reason: HOSPADM

## 2023-03-21 RX ORDER — LOSARTAN POTASSIUM 25 MG/1
25 TABLET ORAL NIGHTLY
Qty: 90 TABLET | Refills: 1 | Status: SHIPPED | OUTPATIENT
Start: 2023-03-21

## 2023-03-21 RX ORDER — CLOPIDOGREL BISULFATE 75 MG/1
75 TABLET ORAL DAILY
Qty: 90 TABLET | Refills: 3 | Status: SHIPPED | OUTPATIENT
Start: 2023-03-21

## 2023-03-21 RX ADMIN — ROSUVASTATIN CALCIUM 10 MG: 10 TABLET, FILM COATED ORAL at 09:27

## 2023-03-21 RX ADMIN — Medication 81 MG: at 09:27

## 2023-03-21 RX ADMIN — DIGOXIN 250 MCG: 0.25 INJECTION INTRAMUSCULAR; INTRAVENOUS at 03:58

## 2023-03-21 RX ADMIN — METOPROLOL TARTRATE 50 MG: 50 TABLET, FILM COATED ORAL at 09:27

## 2023-03-21 RX ADMIN — ACETAMINOPHEN 325MG 650 MG: 325 TABLET ORAL at 09:41

## 2023-03-21 RX ADMIN — DIGOXIN 250 MCG: 0.25 INJECTION INTRAMUSCULAR; INTRAVENOUS at 00:14

## 2023-03-21 RX ADMIN — APIXABAN 5 MG: 5 TABLET, FILM COATED ORAL at 09:27

## 2023-03-21 RX ADMIN — DIGOXIN 250 MCG: 0.25 INJECTION INTRAMUSCULAR; INTRAVENOUS at 07:33

## 2023-03-21 RX ADMIN — CLOPIDOGREL BISULFATE 75 MG: 75 TABLET ORAL at 09:27

## 2023-03-21 NOTE — PROGRESS NOTES
"  [facility] at [facility]  PROGRESS NOTE    Date of Admission: 3/20/2023  Date of Service: 03/21/23    Primary Care Physician: Jimmie Alonso MD    Chief Complaint: follow up NSTEMI/CAD/atrial fibrillation    Subjective    Rested well overnight, no chest pain or shortness of breath.  Wishes to go home.  Heart rate is better controlled.      Objective   Vitals: /88 (BP Location: Left arm, Patient Position: Lying)   Pulse 76   Temp 99.1 °F (37.3 °C) (Oral)   Resp 16   Ht 170.2 cm (67\")   Wt 68.4 kg (150 lb 14.4 oz)   SpO2 92%   BMI 23.63 kg/m²     Physical Exam:  General: No apparent distress.  Neck: no JVD.  Chest:No respiratory distress, breath sounds are equal, few scattered rhonchi.   Cardiovascular: Normal S1 and S2, irregular/distant.      Extremities: No edema.  Right groin stable/intact.    Results:  Results from last 7 days   Lab Units 03/21/23  0522   WBC 10*3/mm3 13.87*   HEMOGLOBIN g/dL 13.5   HEMATOCRIT % 42.4   PLATELETS 10*3/mm3 241     Results from last 7 days   Lab Units 03/21/23  0522   SODIUM mmol/L 137   POTASSIUM mmol/L 4.6   CHLORIDE mmol/L 100   CO2 mmol/L 29.0   BUN mg/dL 15   CREATININE mg/dL 0.75*   GLUCOSE mg/dL 92      Lab Results   Component Value Date    CHOL 119 05/20/2019    TRIG 72 05/20/2019    HDL 34 (L) 05/20/2019    LDL 71 05/20/2019    AST 11 03/21/2022    ALT <5 03/21/2022                                     Intake/Output Summary (Last 24 hours) at 3/21/2023 0842  Last data filed at 3/21/2023 0359  Gross per 24 hour   Intake 240 ml   Output 950 ml   Net -710 ml       I personally reviewed the patient's EKG/Telemetry data      FINAL IMPRESSION:  • 100% occlusion of the major obtuse marginal trunk of the circumflex successfully stented with 3.0 x 15 mm BEATRIS and reduced to 0%.  • 70% stenosis of the upper branch of the obtuse marginal trunk successfully stented with 2.5 x 23 mm BEATRIS and reduced to 0%.  • Nonobstructive disease of the LAD, " the distal segment of the upper obtuse marginal trunk and the RCA.  This disease is to be managed with medical therapy.  • Moderate left ventricular systolic dysfunction, estimated EF 36%.     RECOMMENDATIONS:  • Optimize medical therapy and risk factors management per guidelines.  • Echocardiogram for better assessment of LV function.  • Due to atrial fibrillation and need for anticoagulation therapy the patient will be treated with single antiplatelet therapy with Plavix.    Current Medications:  apixaban, 5 mg, Oral, Q12H  aspirin, 81 mg, Oral, Daily  clopidogrel, 75 mg, Oral, Daily  metoprolol tartrate, 50 mg, Oral, Q12H  pharmacy consult - MTM, , Does not apply, Daily  rosuvastatin, 10 mg, Oral, Daily           Assessment  1. ACS/non-STEMI   2. C this admission with 100% occulusion of major OM trunk oc LCx successfully stented, 70% stenosis of upper branch of OM successfully stented and reduced to 0%. LVEF 36%  3. Chronic/permanent atrial fibrillation, currently in atrial fib. Was only taking home Eliquis once daily.  Heart rate is better controlled.  4. Chronic systolic heart failure, stable  5. History of dilated cardiomyopathy, EF 45%.    6. Hypertension  7. Dyslipidemia, on statin therapy  8. Tobacco use    Plan  1. Add losartan 25 mg daily.  2. Continue single antiplatelet therapy with Plavix as he will also be on Eliquis 5 mg twice daily.  3. Continue metoprolol, current statin therapy and resume Zetia on discharge.  4. No SGLT2 inhibitors/MRA at present due to borderline blood pressure, will consider adding as outpatient.  5. Discharge to home, follow-up at [place] office in 6 weeks.  Condition is stable.    Paty Brown PA-C    I have seen and examined the patient, case was discussed with the physician extender, reviewed the above note, necessary changes were made and I agree with the final note.   Rick Whitten MD, [facility], [facility]

## 2023-03-21 NOTE — PROGRESS NOTES
Pt. Referred for Phase II Cardiac Rehab. Staff discussed benefits of exercise, program protocol, and educational material provided. Teach back verified. Patient refused cardiac rehab at this time. Staff gave [facility] home exercise benefits and guidelines. Teach back verified. Patient states he will call to schedule if later interested in program.

## 2023-03-21 NOTE — CASE MANAGEMENT/SOCIAL WORK
Discharge Planning Assessment  [facility]     Patient Name: Eliud Crouch  MRN: [id number]  Today's Date: 3/21/2023    Admit Date: 3/20/2023    Plan: Home   Discharge Needs Assessment     Row Name 03/21/23 0935       Living Environment    People in Home spouse    Current Living Arrangements home    Primary Care Provided by self    Provides Primary Care For no one    Family Caregiver if Needed spouse    Quality of Family Relationships involved;supportive    Able to Return to Prior Arrangements yes       Resource/Environmental Concerns    Resource/Environmental Concerns none       Transition Planning    Patient/Family Anticipates Transition to home with family    Patient/Family Anticipated Services at Transition     Transportation Anticipated family or friend will provide       Discharge Needs Assessment    Readmission Within the Last 30 Days no previous admission in last 30 days    Equipment Currently Used at Home none    Concerns to be Addressed discharge planning    Anticipated Changes Related to Illness none    Equipment Needed After Discharge none               Discharge Plan     Row Name 03/21/23 0936       Plan    Plan Home    Patient/Family in Agreement with Plan yes    Plan Comments Spoke with patient in room to initiate discharge planning.  He lives with his wife in [facility].  He is independent with ADL's.  He has no DME at home and is not current with home health.  His PCP is Jimmie Alonso.  He does not have an advanced directive.  Mr. Crouch has RX coverage and has his scripts filled at [place].  His plan is to return home at discharge.  He denies any needs at this time.  CM will continue to follow.    Final Discharge Disposition Code 01 - home or self-care              Continued Care and Services - Admitted Since 3/20/2023    Coordination has not been started for this encounter.       Expected Discharge Date and Time     Expected Discharge Date Expected Discharge Time    Mar  21, 2023          Demographic Summary     Row Name 03/21/23 0934       General Information    Admission Type inpatient    Arrived From hospital    Referral Source admission list    Reason for Consult discharge planning    Preferred Language English               Functional Status     Row Name 03/21/23 0934       Functional Status    Usual Activity Tolerance moderate    Current Activity Tolerance moderate       Functional Status, IADL    Medications independent    Meal Preparation independent    Housekeeping independent    Laundry independent    Shopping independent               Psychosocial    No documentation.                Abuse/Neglect    No documentation.                Legal    No documentation.                Substance Abuse    No documentation.                Patient Forms    No documentation.                   Gely Mendiola RN

## 2023-03-21 NOTE — PAYOR COMM NOTE
"WillaBulmaro Yossi (67 y.o. Male)     Carmenza Hoffmann, [employer]ITY  Utilization Review  Nsing-[phone]  [phone]      Date of Birth   1955    Social Security Number       Address   [address] [place] [address]    Home Phone   [phone]    MRN   [id number]       Bahai   None    Marital Status                               Admission Date   3/20/23    Admission Type   Urgent    Admitting Provider   Rick Whitten MD    Attending Provider   Rick Whitten MD    Department, Room/Bed   [facility] 6A, N619/1       Discharge Date       Discharge Disposition   Home or Self Care    Discharge Destination                               Attending Provider: Rick Whitten MD    Allergies: Penicillins, Morphine    Isolation: None   Infection: None   Code Status: CPR    Ht: 170.2 cm (67\")   Wt: 68.4 kg (150 lb 14.4 oz)    Admission Cmt: None   Principal Problem: Angina pectoris (HCC) [I20.9]                 Active Insurance as of 3/20/2023     Primary Coverage     Payor Plan Insurance Group Employer/Plan Group    MEDICARE MEDICARE B ONLY      Payor Plan Address Payor Plan Phone Number Payor Plan Fax Number Effective Dates    [address] [address] [phone]  5/1/2018 - None Entered    [facility] [id number]       Subscriber Name Subscriber Birth Date Member ID       BULMARO NAVARRO 1955 [id number]           Secondary Coverage     Payor Plan Insurance Group Employer/Plan Group    [employer]LL[employer] WELL[place]ARE [place]ICAID [id number]     Payor Plan Address Payor Plan Phone Number Payor Plan Fax Number Effective Dates    [address] [address] [phone]  6/14/2016 - None Entered    [facility] [id number]       Subscriber Name Subscriber Birth Date Member ID       BULMARO NAVARRO 1955 [id number]                 Emergency Contacts      (Rel.) Home Phone Work Phone Mobile Phone    WillaZACH (Spouse) [phone] -- [phone]    KATARZYNAANGELICA TRENT (Son) [phone] -- [phone] "               History & Physical      Rick Whitten MD at 23 1124            Pre-cardiac Catheterization History and Physical  [facility]      Patient:  Eliud Crouch  :  1955  MRN: [id number]    PCP:  Jimmie Alonso MD  PHONE:  [phone]    DATE: 3/20/2023  ID: Eliud Crouch is a 67 y.o. male     CC: NSTEMI    PROBLEM LIST:   1. Coronary artery disease:  a. Remote septal MI.  b. Previous stent to the RCA.  c. [facility], 2003, Dr. Enrrique Morse: Patent RCA stent, normal LV.  d. [facility], 10/31/2012: EF 55-60%, mild MR, nonobstructive disease involving multiple vessels.  e. [facility], 2015: Nonobstructive plaque/disease that involved multiple vessels. EF 35%.  f. [facility], 2019: Nonobstructive CAD involving multiple vessels without hemodynamically significant disease. EF 55%.  g. Echocardiogram, 2020: Normal EF. Atrial fibrillation noted.   2. Chronic permanent atrial fibrillation:  a. CHADS-VASc score = 3 (HTN, Age 65-74, CAD)  b. PIPER/ECV to SR, 2015.  c. PIPER, 2019: EF 55%. Left atrial cavity is moderately dilated. Mild MR/TR. No intracardial thrombus or mass, clear DACIA.  d. ECV, 2019: Successful cardioversion.  e. Holter monitor, 2021: Atrial fibrillation with average rate of 71. Occasional PVC's and rare short NSVT. No significant pauses.   f. Echocardiogram 2022: LVEF 70%. Normal echo.  g. NM cardiac stress test 2022: Normal study.  3. Dilated cardiomyopathy:  a. PIPER, 2015: DCM with EF 40%.  b. PIPER, 2019: EF 55%  4. Hypertension.  5. Hyperlipidemia.  6. Carotid plaque:  a. Carotid duplex, 10/2012: Minimal plaque, normal velocities.  b. Carotid US, 2021: No hemodynamically significant, clinically relevant carotid US findings are noted.   7. Tobacco use:  a. Smokes 2 PPD per 2019 visit.  8. GERD.  9. Osteoarthritis.  10. Chronic neck and back pain.    BRIEF HPI:  Patient is a 67-year-old  male with the above past medical history who presented to [facility] today from an outside hospital for left heart catheterization.  He initially presented to [facility] on 3/18/2023 for complaints of significant dyspnea on exertion and smothering.  Patient reported that this has been going on for a couple of weeks.  He states he did have an episode of chest pain just prior to this starting.  He has been unable to lie flat or exert himself for several days now.  When he got to [facility] he was found to be in atrial fibrillation with RVR.  His troponin trended up to 36. He was started on heparin drip and transferred to [facility].     Today he states that his shortness of breath has improved but he does continue to feel short of breath and have orthopnea.  He is currently chest pain-free.  He is in atrial fibrillation on telemetry with borderline rate control.    Cardiac Risk Factors: advanced age (older than 55 for men, 65 for women), dyslipidemia, hypertension, male gender, sedentary lifestyle and smoking/ tobacco exposure    Allergies:      Allergies   Allergen Reactions   • Penicillins Other (See Comments)     unknown   • Morphine Anxiety       MEDICATIONS:  Current Outpatient Medications   Medication Instructions   • albuterol sulfate  (90 Base) MCG/ACT inhaler 2 puffs, Inhalation, Every 4 Hours PRN   • apixaban (ELIQUIS) 5 mg, Oral, Daily, Once daily per patient   • aspirin 81 mg, Oral, Daily   • ezetimibe (ZETIA) 10 MG tablet No dose, route, or frequency recorded.   • labetalol (NORMODYNE) 300 mg, Oral, As Needed, As needed per patient.   • oxyCODONE-acetaminophen (PERCOCET)  MG per tablet 1 tablet, Oral, Every 6 Hours PRN   • pantoprazole (PROTONIX) 40 mg, Oral, Daily   • rOPINIRole (REQUIP) 1 MG tablet Nightly   • rosuvastatin (CRESTOR) 10 MG tablet Daily       Past medical & surgical history, social and family history reviewed in the electronic medical record.    ROS:  Pertinent positives listed in the HPI and problem list above. All others reviewed and negative.     Physical Exam:   There were no vitals taken for this visit.    Constitutional:    Alert, cooperative, in no acute distress   Neck:     No Jugular venous distention, adenopathy, or thyromegaly noted.    Heart:    Regular rhythm and normal rate, normal S1 and S2, no murmurs,gallops, rubs, or clicks. No distinct PMI noted.    Lungs:     Clear to auscultation bilaterally, respirations regular, even and unlabored    Abdomen:     Soft nontender, nondistended, normal bowel sounds   Extremities:   No gross deformities, no edema, clubbing, or cyanosis.    Pulses:   Peripheral pulses palpable and equal bilaterally.     Barbaeu Test:  Left: Abnormal  (oxymetric Allens) Right: Abnormal:     Labs and Diagnostic Data:  Lab review 3/20/2023:   CMP: , BUN 25, creatinine 0.65, , K3.7, , CO2 31  CBC: WBC 16.3, Hgb 13.7, HCT 42.2,   Troponin: 32.8, 29.7, 36, 34, 22.3  Lactic acid 3/18/2023: 2.1      EKG/Tele: Atrial fibrillation, borderline ventricular response    IMPRESSION:  · NSTEMI   · Presented to OSH with features of unstable angina and found with initial troponin which trended up to 36.  · On heparin drip.  · Atrial fibrillation, borderline ventricular response  · Only taking Eliquis once daily instead of twice daily.  Has been on heparin drip since 3/18/2023    PLAN:  · Procedure to perform: LHC +/- CBI. Risks, benefits and alternatives to the procedure explained to the patient and he understands and wishes to proceed.   · Will need echocardiogram post procedure.      Paty Brown PA-C    I have seen and examined the patient, case was discussed with the physician extender, reviewed the above note, necessary changes were made and I agree with the final note.   Rick Whitten MD, [facility], [facility]        Electronically signed by Rick Whitten MD at 03/20/23 9381       Emergency Department Notes    No notes  of this type exist for this encounter.         Vital Signs (last day)     Date/Time Temp Temp src Pulse Resp BP Patient Position SpO2    03/21/23 0731 99.1 (37.3) Oral 76 16 129/88 Lying 92    03/21/23 0630 -- -- 64 -- -- -- --    03/21/23 0358 -- -- 85 -- -- -- --    03/21/23 0356 98.2 (36.8) Oral 71 12 132/90 Lying 95    03/21/23 0220 -- -- 77 -- -- -- 93    03/21/23 0023 98.4 (36.9) Oral 75 16 123/90 Lying 95    03/21/23 0014 -- -- 78 -- 117/88 -- 97    03/20/23 2039 -- -- 80 -- 113/98 -- 96    03/20/23 1947 97.9 (36.6) Oral 94 16 122/97 Lying 95    03/20/23 1845 -- -- 102 -- 108/74 -- 95    03/20/23 1830 -- -- 116 -- 102/78 -- 97    03/20/23 1815 -- -- 137 -- 126/99 -- 88    03/20/23 1800 -- -- 92 -- 100/76 -- 94    03/20/23 1745 -- -- 96 -- 102/60 -- 95    03/20/23 1730 -- -- 117 -- 96/61 -- 97    03/20/23 1715 -- -- 110 -- 118/78 -- 96    03/20/23 1700 -- -- 113 -- 94/73 -- 93    03/20/23 1645 -- -- 107 -- 109/85 -- 95    03/20/23 1640 -- -- -- -- 116/83 -- --    03/20/23 1615 -- -- 111 -- 108/95 -- 95    03/20/23 1605 -- -- 103 -- 119/93 -- 95    03/20/23 1500 -- -- 115 -- 97/66 -- 96    03/20/23 1445 -- -- 98 -- 111/74 -- 94    03/20/23 1435 -- -- 108 16 105/90 Lying 92    03/20/23 14:22:32 -- -- 114 18 -- -- 92    03/20/23 13:45:52 -- -- 95 18 156/113 -- 94    03/20/23 1110 97.5 (36.4) Temporal 87 18 131/88 Lying 97    03/20/23 1109 -- -- -- -- 121/88 Lying --          Oxygen Therapy (last day)     Date/Time SpO2 Device (Oxygen Therapy) Flow (L/min) Oxygen Concentration (%) ETCO2 (mmHg)    03/21/23 0731 92 room air -- -- --    03/21/23 0630 -- room air -- -- --    03/21/23 0356 95 room air -- -- --    03/21/23 0220 93 room air -- -- --    03/21/23 0023 95 room air -- -- --    03/21/23 0014 97 room air -- -- --    03/20/23 2039 96 room air -- -- --    03/20/23 1947 95 room air -- -- --    03/20/23 1845 95 -- -- -- --    03/20/23 1830 97 -- -- -- --    03/20/23 1815 88 -- -- -- --    03/20/23 1800 94 room air  -- -- --    03/20/23 1745 95 -- -- -- --    03/20/23 1730 97 -- -- -- --    03/20/23 1715 96 -- -- -- --    03/20/23 1700 93 -- -- -- --    03/20/23 1645 95 -- -- -- --    03/20/23 1615 95 -- -- -- --    03/20/23 1605 95 -- -- -- --    03/20/23 1500 96 -- -- -- --    03/20/23 1445 94 -- -- -- --    03/20/23 1435 92 room air -- -- --    03/20/23 14:22:32 92 -- -- -- --    03/20/23 13:45:52 94 -- -- -- --    03/20/23 1110 97 room air -- -- --          Lines, Drains & Airways     Active LDAs     Name Placement date Placement time Site Days    Peripheral IV 03/20/23 1120 Left Antecubital 03/20/23  1120  Antecubital  less than 1                  Current Facility-Administered Medications   Medication Dose Route Frequency Provider Last Rate Last Admin   • acetaminophen (TYLENOL) tablet 650 mg  650 mg Oral Q4H PRN Rick Whitten MD   650 mg at 03/21/23 0941   • apixaban (ELIQUIS) tablet 5 mg  5 mg Oral Q12H Rick Whitten MD   5 mg at 03/21/23 0927   • aspirin EC tablet 81 mg  81 mg Oral Daily Rick Whitten MD   81 mg at 03/21/23 0927   • clopidogrel (PLAVIX) tablet 75 mg  75 mg Oral Daily Rick Whitten MD   75 mg at 03/21/23 0927   • losartan (COZAAR) tablet 25 mg  25 mg Oral Nightly Rick Whitten MD       • metoprolol tartrate (LOPRESSOR) tablet 50 mg  50 mg Oral Q12H Rick Whitten MD   50 mg at 03/21/23 0927   • oxyCODONE-acetaminophen (PERCOCET)  MG per tablet 1 tablet  1 tablet Oral Q6H PRN Rick Whitten MD   1 tablet at 03/20/23 0727   • Pharmacy Consult - MTM   Does not apply Daily Augustin Posadas, [facility]   Given at 03/21/23 0941   • rosuvastatin (CRESTOR) tablet 10 mg  10 mg Oral Daily Rick Whitten MD   10 mg at 03/21/23 0927       Lab Results (last 24 hours)     Procedure Component Value Units Date/Time    P2Y12 Platelet Inhibition [[id number]] Collected: 03/21/23 0522    Specimen: Blood Updated: 03/21/23 0648     P2Y12 Reactivity Unit 89 PRU     Narrative:      P2Y12 Interpretation:  Pre-Drug normal reference  range is 194-418 PRU.  Test results are reported in P2Y12 reaction units (PRU). This measures the extent of platelet aggregation in the presence of P2Y12 inhibitor drugs, such as clopidogrel (Plavix), prasugrel (Effient), ticagrelor (Brilinta), ticlopidine (Ticlid).  P2Y12 values <194 PRU (low end of reference range) are specific evidence of a P2Y12 inhibitor effect.  Patients who have been treated with Glycoprotein IIb/IIIa inhibitors should not be tested until platelet function has recovered. This time period is approximately 14 days after discontinuation of abciximab (ReoPro) and up to 48 hours after discontinuation of eptifibatide (Integrilin) and tirofiban (Aggrastat).   The P2Y12 test results should be interpreted in conjunction with other clinical and lab data available to the clinician.    Basic Metabolic Panel [[id number]]  (Abnormal) Collected: 03/21/23 0522    Specimen: Blood Updated: 03/21/23 0646     Glucose 92 mg/dL      BUN 15 mg/dL      Creatinine 0.75 mg/dL      Sodium 137 mmol/L      Potassium 4.6 mmol/L      Chloride 100 mmol/L      CO2 29.0 mmol/L      Calcium 8.7 mg/dL      BUN/Creatinine Ratio 20.0     Anion Gap 8.0 mmol/L      eGFR 97.7 mL/min/1.73     Narrative:      GFR Normal >60  Chronic Kidney Disease <60  Kidney Failure <15      CBC (No Diff) [[id number]]  (Abnormal) Collected: 03/21/23 0522    Specimen: Blood Updated: 03/21/23 0546     WBC 13.87 10*3/mm3      RBC 4.81 10*6/mm3      Hemoglobin 13.5 g/dL      Hematocrit 42.4 %      MCV 88.1 fL      MCH 28.1 pg      MCHC 31.8 g/dL      RDW 14.3 %      RDW-SD 45.8 fl      MPV 10.9 fL      Platelets 241 10*3/mm3         Imaging Results (Last 24 Hours)     ** No results found for the last 24 hours. **        ECG/EMG Results (last 24 hours)     Procedure Component Value Units Date/Time    SCANNED - TELEMETRY   [[id number]] Resulted: 03/20/23     Updated: 03/20/23 1243    ECG 12 Lead Other; Post-Cath [[id number]] Collected: 03/20/23 1444      Updated: 03/20/23 1525     QT Interval 374 ms      QTC Interval 484 ms     Narrative:      Test Reason : Other~  Blood Pressure :   */*   mmHG  Vent. Rate : 101 BPM     Atrial Rate :  98 BPM     P-R Int :   * ms          QRS Dur :  88 ms      QT Int : 374 ms       P-R-T Axes :   * -38  59 degrees     QTc Int : 484 ms    Atrial fibrillation with rapid ventricular response with premature ventricular or aberrantly conducted complexes  Left axis deviation  Marked ST abnormality, possible anterior subendocardial injury  Abnormal ECG  When compared with ECG of 23-FEB-2015 14:59,  Significant changes have occurred    Referred By: LENO           Confirmed By:     Transthoracic Echo Complete With Contrast if Necessary Per Protocol [[id number]] Resulted: 03/21/23 0643     Updated: 03/21/23 0650     Target HR (85%) 128 bpm      Max. Pred. HR (100%) 151 bpm      EF(MOD-bp) 56.3 %      LVIDd 4.4 cm      LVIDs 3.0 cm      IVSd 1.03 cm      LVPWd 1.01 cm      FS 32.0 %      IVS/LVPW 1.01 cm      ESV(cubed) 26.5 ml      EDV(cubed) 84.2 ml      LVOT area 3.2 cm2      LV mass(C)d 150.9 grams      LVOT diam 2.00 cm      EDV(MOD-sp2) 63.4 ml      EDV(MOD-sp4) 98.1 ml      ESV(MOD-sp2) 29.5 ml      ESV(MOD-sp4) 40.6 ml      SV(MOD-sp2) 33.9 ml      SV(MOD-sp4) 57.5 ml      EF(MOD-sp2) 53.5 %      EF(MOD-sp4) 58.6 %      MV E max severino 65.1 cm/sec      MV dec time 0.20 msec      LA ESV Index (BP) 39.9 ml/m2      Med Peak E' Severino 7.6 cm/sec      Lat Peak E' Severino 8.7 cm/sec      Avg E/e' ratio 7.99     SV(LVOT) 34.5 ml      RV Base 3.7 cm      RV Mid 2.9 cm      RV Length 5.9 cm      TAPSE (>1.6) 2.9 cm      RV S' 14.4 cm/sec      LA dimension (2D)  4.5 cm      LV V1 max 72.0 cm/sec      LV V1 max PG 2.07 mmHg      LV V1 mean PG 0.86 mmHg      LV V1 VTI 10.9 cm      Ao pk severino 93.9 cm/sec      Ao max PG 3.5 mmHg      Ao mean PG 2.22 mmHg      Ao V2 VTI 13.7 cm      EFRAIN(I,D) 2.5 cm2      MV max PG 3.5 mmHg      MV mean PG 1.65 mmHg      MV V2  "VTI 14.1 cm      MV P1/2t 45.0 msec      MVA(P1/2t) 4.9 cm2      MVA(VTI) 2.46 cm2      MV dec slope 609.6 cm/sec2      TR max angie 213.6 cm/sec      TR max PG 18.2 mmHg      PA acc time 0.06 sec      PA pr(Accel) 53.6 mmHg      Ao root diam 3.0 cm      Echo EF Estimated 45.0 %     Narrative:      •  Left ventricular systolic function is mildly decreased. Estimated left   ventricular EF = 45%  •  Mild to moderate left atrial enlargement, left atrial volume is mildly   increased.  •  Mild mitral regurgitation.  •  Trace tricuspid regurgitation with normal RVSP.  •  Trivial, probably physiologic pericardial effusion.            Operative/Procedure Notes (last 24 hours)  Notes from 03/20/23 0953 through 03/21/23 0953   No notes of this type exist for this encounter.            Physician Progress Notes (last 24 hours)      Rick Whitten MD at 03/21/23 0820            [facility]  PROGRESS NOTE    Date of Admission: 3/20/2023  Date of Service: 03/21/23    Primary Care Physician: Jimmie Alonso MD    Chief Complaint: follow up NSTEMI/CAD/atrial fibrillation    Subjective    Rested well overnight, no chest pain or shortness of breath.  Wishes to go home.  Heart rate is better controlled.      Objective   Vitals: /88 (BP Location: Left arm, Patient Position: Lying)   Pulse 76   Temp 99.1 °F (37.3 °C) (Oral)   Resp 16   Ht 170.2 cm (67\")   Wt 68.4 kg (150 lb 14.4 oz)   SpO2 92%   BMI 23.63 kg/m²     Physical Exam:  General: No apparent distress.  Neck: no JVD.  Chest:No respiratory distress, breath sounds are equal, few scattered rhonchi.   Cardiovascular: Normal S1 and S2, irregular/distant.      Extremities: No edema.  Right groin stable/intact.    Results:  Results from last 7 days   Lab Units 03/21/23  0522   WBC 10*3/mm3 13.87*   HEMOGLOBIN g/dL 13.5   HEMATOCRIT % 42.4   PLATELETS 10*3/mm3 241     Results from last 7 days   Lab Units 03/21/23  0522   SODIUM mmol/L " 137   POTASSIUM mmol/L 4.6   CHLORIDE mmol/L 100   CO2 mmol/L 29.0   BUN mg/dL 15   CREATININE mg/dL 0.75*   GLUCOSE mg/dL 92      Lab Results   Component Value Date    CHOL 119 05/20/2019    TRIG 72 05/20/2019    HDL 34 (L) 05/20/2019    LDL 71 05/20/2019    AST 11 03/21/2022    ALT <5 03/21/2022                                     Intake/Output Summary (Last 24 hours) at 3/21/2023 0842  Last data filed at 3/21/2023 0359  Gross per 24 hour   Intake 240 ml   Output 950 ml   Net -710 ml       I personally reviewed the patient's EKG/Telemetry data      FINAL IMPRESSION:  • 100% occlusion of the major obtuse marginal trunk of the circumflex successfully stented with 3.0 x 15 mm BEATRIS and reduced to 0%.  • 70% stenosis of the upper branch of the obtuse marginal trunk successfully stented with 2.5 x 23 mm BEATRIS and reduced to 0%.  • Nonobstructive disease of the LAD, the distal segment of the upper obtuse marginal trunk and the RCA.  This disease is to be managed with medical therapy.  • Moderate left ventricular systolic dysfunction, estimated EF 36%.     RECOMMENDATIONS:  • Optimize medical therapy and risk factors management per guidelines.  • Echocardiogram for better assessment of LV function.  • Due to atrial fibrillation and need for anticoagulation therapy the patient will be treated with single antiplatelet therapy with Plavix.    Current Medications:  apixaban, 5 mg, Oral, Q12H  aspirin, 81 mg, Oral, Daily  clopidogrel, 75 mg, Oral, Daily  metoprolol tartrate, 50 mg, Oral, Q12H  pharmacy consult - [place], , Does not apply, Daily  rosuvastatin, 10 mg, Oral, Daily           Assessment  11. ACS/non-STEMI   12. [facility] this admission with 100% occulusion of major OM trunk oc LCx successfully stented, 70% stenosis of upper branch of OM successfully stented and reduced to 0%. LVEF 36%  13. Chronic/permanent atrial fibrillation, currently in atrial fib. Was only taking home Eliquis once daily.  Heart rate is better  controlled.  14. Chronic systolic heart failure, stable  15. History of dilated cardiomyopathy, EF 45%.    16. Hypertension  17. Dyslipidemia, on statin therapy  18. Tobacco use    Plan  1. Add losartan 25 mg daily.  2. Continue single antiplatelet therapy with Plavix as he will also be on Eliquis 5 mg twice daily.  3. Continue metoprolol, current statin therapy and resume Zetia on discharge.  4. No SGLT2 inhibitors/MRA at present due to borderline blood pressure, will consider adding as outpatient.  5. Discharge to home, follow-up at [facility] in 6 weeks.  Condition is stable.    Paty Brown PA-C    I have seen and examined the patient, case was discussed with the physician extender, reviewed the above note, necessary changes were made and I agree with the final note.   Rick Whitten MD, [facility], [facility]            Electronically signed by Rick Whitten MD at [address] [place] [address] CATH LAB  [address] [place], [place] [id number] [phone]            Eliud Sy Willa  Cardiac Catheterization/Vascular Study  Order# [id number]  Reading physician: Rick Whitten MD Ordering physician: Rick Whitten MD Study date: 3/20/23      Procedures    Left Heart Cath        Patient Information    Patient Name   Eliud Crouch MRN   [id number] Legal Sex   Male  (Age)   1955 (67 y.o.)     Race Ethnicity Encounter Category   White or  Not  or  Urgent        [facility] PACS Images     Show images for Cardiac Catheterization/Vascular Study       Printable Vessel Diagram    Printable Vessel Diagram       Physicians    Panel Physicians Referring Physician Case Authorizing Physician   Rick Whitten MD (Primary)  Edwin Godwin PA       Indications    Angina pectoris (HCC) [I20.9 (ICD-10-CM)]         Pre Procedure Diagnosis    angina         Conclusion    FINAL IMPRESSION:  • 100% occlusion of the major obtuse marginal trunk of the circumflex successfully  stented with 3.0 x 15 mm BEATRIS and reduced to 0%.  • 70% stenosis of the upper branch of the obtuse marginal trunk successfully stented with 2.5 x 23 mm BEATRIS and reduced to 0%.  • Nonobstructive disease of the LAD, the distal segment of the upper obtuse marginal trunk and the RCA.  This disease is to be managed with medical therapy.  • Moderate left ventricular systolic dysfunction, estimated EF 36%.     RECOMMENDATIONS:  • Optimize medical therapy and risk factors management per guidelines.  • Echocardiogram for better assessment of LV function.  • Due to atrial fibrillation and need for anticoagulation therapy the patient will be treated with single antiplatelet therapy with Plavix.     Indications: ACS/non-STEMI.     Access: Right femoral.     Procedures:   • Left heart catheterization.  • Left ventriculogram.  • Selective coronary angiography.  • PTCA/stenting x2 of the obtuse marginal branch of the circumflex  • Arterial site hemostasis with Angio-Seal.     Procedure narrative:  The patient was brought to the catheterization lab in a fasting condition.  Access site was prepped and draped in standard sterile fashion.  Lidocaine was injected and arterial access was obtained by percutaneous anterior wall puncture technique.  A 6 Indonesian arterial sheath was placed. Above procedures were performed without complications.    Following the angiograms the left coronary artery was engaged with CLS 3.5 guide catheter.  The total occlusion of the first obtuse marginal was crossed with a PT 2 wire.  Balloon angioplasty was performed with a 2.0 mm balloon.  This established flow into the bifurcating vessel which had another 70% stenosis in the upper branch with diffuse distal plaque.  The proximal segment of the twos marginal trunk was successfully stented with a 3.0 x 15 mm BEATRIS which was fully deployed and postdilated with excellent results and the hypersensitive as well as reduced to 0%.  The upper obtuse marginal branch was  successfully stented with a 2.5 x 23 mm BETARIS and the 70% stenosis was reduced to 0%.  The diffuse distal 50% lesion was left for medical therapy.  The lower branch was free of significant disease.  Nitroglycerin was injected and final angiograms were taken.  The guide catheter was removed.    At the conclusion the arterial sheath was removed and hemostasis was achieved.  The patient was transferred to the unit in a stable condition.     Hemodynamic Findings:   Heart Rate: 110/minute.  LV pressure: 140/16 mmHg, on pull back no gradient was recorded across the aortic valve.     Angiographic Findings:  Right coronary dominance.  • LM: Mild diffuse plaque with 30% narrowing.  • LAD: 30% focal proximal plaque.  40% focal calcified mid segment plaque after the origin of a medium size diagonal branch.  Minor distal irregularities.  No hemodynamically significant disease is noted.  • LCX: The first obtuse marginal branch is totally occluded proximally.  Rest of the circumflex continues in the AV groove to give of tiny distal branches.  The first OM was successfully treated with PTCA followed by placement of a 3.0 x 15 mm BEATRIS in the proximal vessel and a 2.5 x 23 mm BEATRIS in the upper branch with satisfactory results and no significant residual stenosis.  The distal segment of the upper marginal branch has diffuse 50% plaque distal to the stent which will be managed with medical therapy.  The lower branch is free of significant disease.  • RCA: Mild diffuse plaque from mid to distal vessel with segments of up to 40% mid and distal stenosis without hemodynamically significant disease.   • LV: Left ventriculogram performed in 30 BETTS projection revealed global hypokinesis with anterolateral dyskinesis.  Overall systolic function is moderately depressed with estimated ejection fraction of 36%.  No significant mitral regurgitation was noted.       Complications: No acute procedure related complications.                 Procedure  Narrative    Risks, benefits and alternatives were discussed with the patient and/or family. Plan is for minimal sedation. Less than 20 mL of estimated blood loss during the case. No specimen was collected during the case.       Time Under Physician Observation    Name Panel Role Time Period   Rick Whitten MD Panel 1 Primary 3/20/2023 1351 - 3/20/2023 1427      Total Sedation Time    Moderate sedation event time was not documented.                     Vessel Access    A 6 fr sheath was  inserted into the right femoral artery. Sheath insertion not delayed.           Sheath Disposition    Hemostasis started on the right femoral artery. Angio-Seal was used in achieving hemostasis. Closure device deployed in the vessel. Hemostasis achieved successfully.         Stent Inflated     Event Details User   2:11 PM Stent Inflated Stnt Cornry Rx Xience/Skypoint Rapdxng 3x15mm - First balloon inflation max pressure = 12 natan. First balloon inflation duration = 20 seconds. Second inflation of balloon - Max pressure = 12 natan. 2nd Inflation of balloon - Duration = 10 seconds. JM   2:14 PM Stent Inflated Stnt Cornry Rx Xience/Skypoint Rapdxng 2.87u61tm - First balloon inflation max pressure = 12 natan. First balloon inflation duration = 20 seconds. JM       Balloon Inflated     Event Details User   2:07 PM Balloon Inflated Baln Trek Mini Rx 2x12mm - First balloon inflation max pressure = 12 natan. First balloon inflation duration = 10 seconds. Second inflation of balloon - Max pressure = 12 natan. 2nd Inflation of balloon - Duration = 10 seconds. Third inflation of balloon - Max pressure = 20 natan. 3rd Inflation of balloon - Duration = 10 seconds. JM                    Radiation     Event Details User   2:22 PM Radiation Tracking Panel 1: Rick Whitten MD   Cumulative Air Kerma (mGy) = 646.000; Fluoro Time (min) = 7.0 JM        Total Contrast    Medication Name Total Dose   iopamidol (ISOVUE-370) 76 % injection 200 mL         Patient Hx Of  "Height, Weight, and Vitals    Height Weight BSA (Calculated - sq m) BMI (Calculated) Retired BMI (kg/m2) Pulse BP   170.2 cm (67\") 68.4 kg (150 lb 14.4 oz) 1.79 sq meters 23.6  96 102/60       Admission Information    Admission Date/Time Discharge Date/Time Room/Bed   03/20/23  10:53 AM  N619/1       Medications  (Filter: Administrations occurring from 0000 to 1759 on 03/20/23)   important  Continuous medications are totaled by the amount administered until 03/20/23 1759.       O2 (OXYGEN) (L)  Total volume:  2 L    Date/Time Rate/Dose/Volume Action    03/20/23 1347 2 L Given      midazolam (VERSED) injection (mg)  Total dose:  4 mg    Date/Time Rate/Dose/Volume Action    03/20/23 1353 2 mg Given    1403 2 mg Given      lidocaine PF 1% (XYLOCAINE) injection (mL)  Total volume:  10 mL    Date/Time Rate/Dose/Volume Action    03/20/23 1353 10 mL Given      bivalirudin (ANGIOMAX) bolus from bag (mg/kg)  Total dose:  51.3 mg Dosing weight:  68.4    Date/Time Rate/Dose/Volume Action    03/20/23 1402 51.3 mg Given      Bivalirudin Trifluoroacetate (ANGIOMAX) 250 mg in sodium chloride 0.9 % 50 mL (5 mg/mL) infusion (mg/kg/hr)  Total dose:  33.72 mg Dosing weight:  68.4    Date/Time Rate/Dose/Volume Action    03/20/23 1403 1.75 mg/kg/hr - 23.9 mL/hr New Bag    1420  Stopped      nitroglycerin 100 mcg/mL in D5W syringe (mcg)  Total dose:  600 mcg    Date/Time Rate/Dose/Volume Action    03/20/23 1412 200 mcg Given    1415 200 mcg Given    1416 200 mcg Given      phenylephrine (SMITA-SYNEPHRINE) injection (mcg)  Total dose:  400 mcg    Date/Time Rate/Dose/Volume Action    03/20/23 1415 200 mcg Given    1417 200 mcg Given      iopamidol (ISOVUE-370) 76 % injection (mL)  Total volume:  200 mL    Date/Time Rate/Dose/Volume Action    03/20/23 1421 200 mL Given      aspirin tablet (mg)  Total dose:  325 mg    Date/Time Rate/Dose/Volume Action    03/20/23 1423 325 mg Given      clopidogrel (PLAVIX) tablet (mg)  Total dose:  600 " mg    Date/Time Rate/Dose/Volume Action    03/20/23 1423 600 mg Given      aspirin EC tablet 81 mg (mg)  Total dose:  0 mg Dosing weight:  68.4    Date/Time Rate/Dose/Volume Action    03/20/23  Canceled Entry      sodium chloride 0.9 % infusion (mL/hr)  Total volume:  348.33 mL Dosing weight:  68.4    Date/Time Rate/Dose/Volume Action    03/20/23 1430 100 mL/hr Restarted        Supplies    Name ID Temporary Type Charge Code Description Charge Code Quantity   KT CONTRST INJ ISOL/MANFLD W/TRANSDCR 641 No Supply HC OR SUPPLY SHELL 270/NO CPT 818365 1   KT CONTRL HND ACIST CVI JERZY HIPRESS 65 4375 No Supply   1   BALN TREK MINI RX 2X12MM 96439 No Balloon HC OR SUPPLY SHELL 278/ 969251 1   CATH DIAG EXPO M/ PK 6FR FL4/FR4 PIG 3PK 84459 No Diagnostic Catheter HC OR SUPPLY SHELL 272/NO CPT 557423 1   DEV INFL MONARCH 25W 48851 No Supply HC OR SUPPLY SHELL 272/NO CPT 499720 1   CATH GUIDE MACH I NO/SH 6F CLS3 35228 No Guide Catheter HC OR SUPPLY SHELL 278/ 893720 1   GW PT 2 MS .014 185CM [place] [address] No Guidewire HC OR SUPPLY SHELL 272/ 193617 1   SHEATH INTRO PINN CORNRY .038 6F10CM 54300 No Supply HC OR SUPPLY SHELL 272/ 118469 1   DEV CLS VASC ANGIOSEAL VIP PLTFRM 6FR 53076 No Hemostasis Device HC OR SUPPLY SHELL 278/ 468253 1   PK CATH CARD 10 47884 No Supply   1   NDL PERC 1PRT THNWALL W/BASEPLT 18G 7CM 89925 No Supply HC OR SUPPLY SHELL 272/NO CPT 546144 1   ELECTRD BACK PAD/LG A/ 252078 No Supply   1   GW PERIPH VASC ADX J/TP SS .035 150CM 3MM 886112 No Guidewire HC OR SUPPLY SHELL 272/ 516128 1       Signed    Electronically signed by Rick Whitten MD on 3/20/23 at 1759 EDT            Link to Procedure Log    Procedure Log        Order-Level Documents:    Scan on 3/20/2023 1341 by New Onbase, Newman: CARDIAC CATH HEMO DATA - SCAN             Results Routing Tracking    View Results Routing Information     Order Report     Order Details

## 2023-03-22 NOTE — OUTREACH NOTE
Prep Survey    Flowsheet Row Responses   [facility] facility patient discharged from? [place]   Is LACE score < 7 ? No   Eligibility Readm Mgmt   Discharge diagnosis 1.ACS/non-STEMI    Does the patient have one of the following disease processes/diagnoses(primary or secondary)? Acute MI (STEMI,NSTEMI)   Does the patient have Home health ordered? No   Is there a DME ordered? No   Prep survey completed? Yes          Philomena GIBBS - Registered Nurse

## 2023-03-23 NOTE — PAYOR COMM NOTE
"Bulmaro Navarro (67 y.o. Male)   [id number]    Carmenza Hoffmann RN  Utilization Review  Biarm-[phone]  [phone]        Date of Birth   1955    Social Security Number       Address   [address] [place] [id number]    Home Phone   [phone]    MRN   [id number]       Mu-ism   None    Marital Status                               Admission Date   3/20/23    Admission Type   Urgent    Admitting Provider   Rick Whitten MD    Attending Provider       Department, Room/Bed   [facility] 6A, N619/1       Discharge Date   3/21/2023    Discharge Disposition   Home or Self Care    Discharge Destination                               Attending Provider: (none)   Allergies: Penicillins, Morphine    Isolation: None   Infection: None   Code Status: Prior    Ht: 170.2 cm (67\")   Wt: 68.4 kg (150 lb 14.4 oz)    Admission Cmt: None   Principal Problem: Angina pectoris (HCC) [I20.9]                 Active Insurance as of 3/20/2023     Primary Coverage     Payor Plan Insurance Group Employer/Plan Group    MEDICARE MEDICARE B ONLY      Payor Plan Address Payor Plan Phone Number Payor Plan Fax Number Effective Dates    [address] [address] [phone]  5/1/2018 - None Entered    [facility] [id number]       Subscriber Name Subscriber Birth Date Member ID       BULMARO NAVARRO 1955 [id number]           Secondary Coverage     Payor Plan Insurance Group Employer/Plan Group    [employer] [place] WELL[employer]ARE MEDICAID [id number]     Payor Plan Address Payor Plan Phone Number Payor Plan Fax Number Effective Dates    [address] [address] [phone]  6/14/2016 - None Entered    [facility] [address]       Subscriber Name Subscriber Birth Date Member ID       BULMARO NAVARRO 1955 [id number]                 Emergency Contacts      (Rel.) Home Phone Work Phone Mobile Phone    WillaZACH (Spouse) [phone] -- [phone]    KATARZYNAANGELICA TRENT (Son) [phone] -- [phone]    " "           Discharge Summary      Paty Brown PA-C at 03/21/23 1030        Progress note from 3/21/2023 to serve as discharge summary.    Electronically signed by Paty Brown PA-C at 03/22/23 0905           Rick Whitten MD   Physician  Cardiology  Progress Notes      Signed  Date of Service:  03/21/23 0820  Creation Time:  03/21/23 0820     Signed             [facility]  PROGRESS NOTE     Date of Admission: 3/20/2023  Date of Service: 03/21/23     Primary Care Physician: Jimmie Alonso MD     Chief Complaint: follow up NSTEMI/CAD/atrial fibrillation     Subjective                                           Rested well overnight, no chest pain or shortness of breath.  Wishes to go home.  Heart rate is better controlled.        Objective   Vitals: /88 (BP Location: Left arm, Patient Position: Lying)   Pulse 76   Temp 99.1 °F (37.3 °C) (Oral)   Resp 16   Ht 170.2 cm (67\")   Wt 68.4 kg (150 lb 14.4 oz)   SpO2 92%   BMI 23.63 kg/m²      Physical Exam:  General: No apparent distress.  Neck: no JVD.  Chest:No respiratory distress, breath sounds are equal, few scattered rhonchi.   Cardiovascular: Normal S1 and S2, irregular/distant.      Extremities: No edema.  Right groin stable/intact.     Results:       Results from last 7 days   Lab Units 03/21/23  0522   WBC 10*3/mm3 13.87*   HEMOGLOBIN g/dL 13.5   HEMATOCRIT % 42.4   PLATELETS 10*3/mm3 241           Results from last 7 days   Lab Units 03/21/23  0522   SODIUM mmol/L 137   POTASSIUM mmol/L 4.6   CHLORIDE mmol/L 100   CO2 mmol/L 29.0   BUN mg/dL 15   CREATININE mg/dL 0.75*   GLUCOSE mg/dL 92            Lab Results   Component Value Date     CHOL 119 05/20/2019     TRIG 72 05/20/2019     HDL 34 (L) 05/20/2019     LDL 71 05/20/2019     AST 11 03/21/2022     ALT <5 03/21/2022                                        Intake/Output Summary (Last 24 hours) at 3/21/2023 0842  Last data filed at 3/21/2023 " 0359      Gross per 24 hour   Intake 240 ml   Output 950 ml   Net -710 ml         I personally reviewed the patient's EKG/Telemetry data        FINAL IMPRESSION:  • 100% occlusion of the major obtuse marginal trunk of the circumflex successfully stented with 3.0 x 15 mm BEATRIS and reduced to 0%.  • 70% stenosis of the upper branch of the obtuse marginal trunk successfully stented with 2.5 x 23 mm BEATRIS and reduced to 0%.  • Nonobstructive disease of the LAD, the distal segment of the upper obtuse marginal trunk and the RCA.  This disease is to be managed with medical therapy.  • Moderate left ventricular systolic dysfunction, estimated EF 36%.     RECOMMENDATIONS:  • Optimize medical therapy and risk factors management per guidelines.  • Echocardiogram for better assessment of LV function.  • Due to atrial fibrillation and need for anticoagulation therapy the patient will be treated with single antiplatelet therapy with Plavix.     Current Medications:  apixaban, 5 mg, Oral, Q12H  aspirin, 81 mg, Oral, Daily  clopidogrel, 75 mg, Oral, Daily  metoprolol tartrate, 50 mg, Oral, Q12H  pharmacy consult - [place], , Does not apply, Daily  rosuvastatin, 10 mg, Oral, Daily           Assessment  1. ACS/non-STEMI   2. C this admission with 100% occulusion of major OM trunk oc LCx successfully stented, 70% stenosis of upper branch of OM successfully stented and reduced to 0%. LVEF 36%  3. Chronic/permanent atrial fibrillation, currently in atrial fib. Was only taking home Eliquis once daily.  Heart rate is better controlled.  4. Chronic systolic heart failure, stable  5. History of dilated cardiomyopathy, EF 45%.    6. Hypertension  7. Dyslipidemia, on statin therapy  8. Tobacco use     Plan  1. Add losartan 25 mg daily.  2. Continue single antiplatelet therapy with Plavix as he will also be on Eliquis 5 mg twice daily.  3. Continue metoprolol, current statin therapy and resume Zetia on discharge.  4. No SGLT2 inhibitors/MRA at  present due to borderline blood pressure, will consider adding as outpatient.  5. Discharge to home, follow-up at [place] office in 6 weeks.  Condition is stable.     Paty Brown PA-C     I have seen and examined the patient, case was discussed with the physician extender, reviewed the above note, necessary changes were made and I agree with the final note.   Rick Whitten MD, FACC, [facility]                          Revision History

## 2023-03-27 LAB
QT INTERVAL: 374 MS
QTC INTERVAL: 484 MS

## 2023-03-31 ENCOUNTER — READMISSION MANAGEMENT (OUTPATIENT)
Dept: CALL CENTER | Facility: HOSPITAL | Age: 68
End: 2023-03-31
Payer: MEDICARE

## 2023-03-31 NOTE — OUTREACH NOTE
AMI Week 2 Survey    Flowsheet Row Responses   [facility] patient discharged from? [place]   Does the patient have one of the following disease processes/diagnoses(primary or secondary)? Acute MI (STEMI,NSTEMI)   Week 2 attempt successful? Yes   Call start time 1503   Call end time 1508   Discharge diagnosis 1.ACS/non-STEMI    Person spoke with today (if not patient) and relationship wife   Meds reviewed with patient/caregiver? Yes   DME comments pt wearing home O2 @ 2L. No pulse ox at home.   Psychosocial issues? No   Comments Pt reports having SOA this morning but is better now.    What is the patient's perception of their health status since discharge? Improving   Is the patient/caregiver able to teach back signs and symptoms of when to call for help immediately: Sudden chest discomfort, Sudden discomfort in arms, back, neck or jaw, Shortness of breath at any time   Is the patient/caregiver able to teach back the hierarchy of who to call/visit for symptoms/problems? PCP, Specialist, Home health nurse, Urgent Care, ED, 911 Yes   Week 2 call completed? Yes   Is the patient interested in additional calls from an ambulatory ?  NOTE:  applies to high risk patients requiring additional follow-up. No   Graduated/Revoked comments quick call pt is lena BROWN - Registered Nurse

## 2024-04-01 RX ORDER — CLOPIDOGREL BISULFATE 75 MG/1
TABLET ORAL
Qty: 90 TABLET | Refills: 0 | Status: SHIPPED | OUTPATIENT
Start: 2024-04-01

## 2024-05-06 ENCOUNTER — APPOINTMENT (OUTPATIENT)
Dept: GENERAL RADIOLOGY | Facility: HOSPITAL | Age: 69
End: 2024-05-06
Payer: MEDICARE

## 2024-05-06 ENCOUNTER — HOSPITAL ENCOUNTER (EMERGENCY)
Facility: HOSPITAL | Age: 69
Discharge: HOME | End: 2024-05-06
Attending: EMERGENCY MEDICINE | Admitting: EMERGENCY MEDICINE
Payer: MEDICARE

## 2024-05-06 ENCOUNTER — APPOINTMENT (OUTPATIENT)
Dept: MRI IMAGING | Facility: HOSPITAL | Age: 69
End: 2024-05-06
Payer: MEDICARE

## 2024-05-06 VITALS
WEIGHT: 165 LBS | BODY MASS INDEX: 25.01 KG/M2 | RESPIRATION RATE: 16 BRPM | HEART RATE: 115 BPM | TEMPERATURE: 98.9 F | SYSTOLIC BLOOD PRESSURE: 146 MMHG | OXYGEN SATURATION: 93 % | DIASTOLIC BLOOD PRESSURE: 105 MMHG | HEIGHT: 68 IN

## 2024-05-06 DIAGNOSIS — R29.818 TRANSIENT NEUROLOGICAL SYMPTOMS: Primary | ICD-10-CM

## 2024-05-06 DIAGNOSIS — R53.1 LEFT-SIDED WEAKNESS: ICD-10-CM

## 2024-05-06 DIAGNOSIS — I25.118 CORONARY ARTERY DISEASE INVOLVING NATIVE CORONARY ARTERY OF NATIVE HEART WITH OTHER FORM OF ANGINA PECTORIS: ICD-10-CM

## 2024-05-06 DIAGNOSIS — R06.09 DYSPNEA ON EXERTION: ICD-10-CM

## 2024-05-06 DIAGNOSIS — R20.0 LEFT SIDED NUMBNESS: ICD-10-CM

## 2024-05-06 DIAGNOSIS — I48.91 ATRIAL FIBRILLATION WITH RAPID VENTRICULAR RESPONSE: ICD-10-CM

## 2024-05-06 DIAGNOSIS — Z86.79 HISTORY OF CORONARY ARTERY DISEASE: ICD-10-CM

## 2024-05-06 DIAGNOSIS — E78.5 HYPERLIPIDEMIA, UNSPECIFIED HYPERLIPIDEMIA TYPE: ICD-10-CM

## 2024-05-06 DIAGNOSIS — Z86.39 HISTORY OF DIABETES MELLITUS: ICD-10-CM

## 2024-05-06 DIAGNOSIS — E11.51 TYPE 2 DIABETES MELLITUS WITH DIABETIC PERIPHERAL ANGIOPATHY WITHOUT GANGRENE, UNSPECIFIED WHETHER LONG TERM INSULIN USE: ICD-10-CM

## 2024-05-06 LAB
ALBUMIN SERPL-MCNC: 3.7 G/DL (ref 3.5–5.2)
ALBUMIN/GLOB SERPL: 1.2 G/DL
ALP SERPL-CCNC: 85 U/L (ref 39–117)
ALT SERPL W P-5'-P-CCNC: 8 U/L (ref 1–41)
ANION GAP SERPL CALCULATED.3IONS-SCNC: 13 MMOL/L (ref 5–15)
AST SERPL-CCNC: 19 U/L (ref 1–40)
BASOPHILS # BLD AUTO: 0.05 10*3/MM3 (ref 0–0.2)
BASOPHILS NFR BLD AUTO: 0.5 % (ref 0–1.5)
BILIRUB SERPL-MCNC: 0.3 MG/DL (ref 0–1.2)
BUN SERPL-MCNC: 11 MG/DL (ref 8–23)
BUN/CREAT SERPL: 11.5 (ref 7–25)
CALCIUM SPEC-SCNC: 8.8 MG/DL (ref 8.6–10.5)
CHLORIDE SERPL-SCNC: 104 MMOL/L (ref 98–107)
CO2 SERPL-SCNC: 29 MMOL/L (ref 22–29)
CREAT SERPL-MCNC: 0.96 MG/DL (ref 0.76–1.27)
DEPRECATED RDW RBC AUTO: 49.7 FL (ref 37–54)
EGFRCR SERPLBLD CKD-EPI 2021: 84.5 ML/MIN/1.73
EOSINOPHIL # BLD AUTO: 0.17 10*3/MM3 (ref 0–0.4)
EOSINOPHIL NFR BLD AUTO: 1.8 % (ref 0.3–6.2)
ERYTHROCYTE [DISTWIDTH] IN BLOOD BY AUTOMATED COUNT: 15.5 % (ref 12.3–15.4)
GLOBULIN UR ELPH-MCNC: 3.2 GM/DL
GLUCOSE SERPL-MCNC: 80 MG/DL (ref 65–99)
HCT VFR BLD AUTO: 38.3 % (ref 37.5–51)
HGB BLD-MCNC: 11.5 G/DL (ref 13–17.7)
HOLD SPECIMEN: NORMAL
IMM GRANULOCYTES # BLD AUTO: 0.02 10*3/MM3 (ref 0–0.05)
IMM GRANULOCYTES NFR BLD AUTO: 0.2 % (ref 0–0.5)
LYMPHOCYTES # BLD AUTO: 2.02 10*3/MM3 (ref 0.7–3.1)
LYMPHOCYTES NFR BLD AUTO: 21 % (ref 19.6–45.3)
MCH RBC QN AUTO: 26.3 PG (ref 26.6–33)
MCHC RBC AUTO-ENTMCNC: 30 G/DL (ref 31.5–35.7)
MCV RBC AUTO: 87.4 FL (ref 79–97)
MONOCYTES # BLD AUTO: 0.77 10*3/MM3 (ref 0.1–0.9)
MONOCYTES NFR BLD AUTO: 8 % (ref 5–12)
NEUTROPHILS NFR BLD AUTO: 6.59 10*3/MM3 (ref 1.7–7)
NEUTROPHILS NFR BLD AUTO: 68.5 % (ref 42.7–76)
NRBC BLD AUTO-RTO: 0 /100 WBC (ref 0–0.2)
NT-PROBNP SERPL-MCNC: 5755 PG/ML (ref 0–900)
PLATELET # BLD AUTO: 267 10*3/MM3 (ref 140–450)
PMV BLD AUTO: 11.2 FL (ref 6–12)
POTASSIUM SERPL-SCNC: 3.9 MMOL/L (ref 3.5–5.2)
PROT SERPL-MCNC: 6.9 G/DL (ref 6–8.5)
RBC # BLD AUTO: 4.38 10*6/MM3 (ref 4.14–5.8)
SODIUM SERPL-SCNC: 146 MMOL/L (ref 136–145)
TROPONIN T SERPL HS-MCNC: 29 NG/L
WBC NRBC COR # BLD AUTO: 9.62 10*3/MM3 (ref 3.4–10.8)
WHOLE BLOOD HOLD COAG: NORMAL
WHOLE BLOOD HOLD SPECIMEN: NORMAL

## 2024-05-06 PROCEDURE — 84484 ASSAY OF TROPONIN QUANT: CPT | Performed by: EMERGENCY MEDICINE

## 2024-05-06 PROCEDURE — 71045 X-RAY EXAM CHEST 1 VIEW: CPT

## 2024-05-06 PROCEDURE — 83880 ASSAY OF NATRIURETIC PEPTIDE: CPT | Performed by: EMERGENCY MEDICINE

## 2024-05-06 PROCEDURE — 80053 COMPREHEN METABOLIC PANEL: CPT | Performed by: EMERGENCY MEDICINE

## 2024-05-06 PROCEDURE — 70547 MR ANGIOGRAPHY NECK W/O DYE: CPT

## 2024-05-06 PROCEDURE — 70544 MR ANGIOGRAPHY HEAD W/O DYE: CPT

## 2024-05-06 PROCEDURE — 93005 ELECTROCARDIOGRAM TRACING: CPT

## 2024-05-06 PROCEDURE — 36415 COLL VENOUS BLD VENIPUNCTURE: CPT

## 2024-05-06 PROCEDURE — A9577 INJ MULTIHANCE: HCPCS | Performed by: EMERGENCY MEDICINE

## 2024-05-06 PROCEDURE — 0 GADOBENATE DIMEGLUMINE 529 MG/ML SOLUTION: Performed by: EMERGENCY MEDICINE

## 2024-05-06 PROCEDURE — 93005 ELECTROCARDIOGRAM TRACING: CPT | Performed by: EMERGENCY MEDICINE

## 2024-05-06 PROCEDURE — 99285 EMERGENCY DEPT VISIT HI MDM: CPT

## 2024-05-06 PROCEDURE — 85025 COMPLETE CBC W/AUTO DIFF WBC: CPT | Performed by: EMERGENCY MEDICINE

## 2024-05-06 PROCEDURE — 70553 MRI BRAIN STEM W/O & W/DYE: CPT

## 2024-05-06 RX ORDER — LEVETIRACETAM 500 MG/1
1000 TABLET ORAL ONCE
Status: DISCONTINUED | OUTPATIENT
Start: 2024-05-06 | End: 2024-05-06 | Stop reason: HOSPADM

## 2024-05-06 RX ORDER — LEVETIRACETAM 500 MG/1
500 TABLET ORAL 2 TIMES DAILY
Qty: 60 TABLET | Refills: 2 | Status: SHIPPED | OUTPATIENT
Start: 2024-05-06 | End: 2024-05-09

## 2024-05-06 RX ORDER — LEVETIRACETAM 500 MG/1
500 TABLET ORAL 2 TIMES DAILY
Qty: 60 TABLET | Refills: 0 | Status: SHIPPED | OUTPATIENT
Start: 2024-05-06 | End: 2024-05-09

## 2024-05-06 RX ORDER — SODIUM CHLORIDE 0.9 % (FLUSH) 0.9 %
10 SYRINGE (ML) INJECTION AS NEEDED
Status: DISCONTINUED | OUTPATIENT
Start: 2024-05-06 | End: 2024-05-06 | Stop reason: HOSPADM

## 2024-05-06 RX ADMIN — GADOBENATE DIMEGLUMINE 15 ML: 529 INJECTION, SOLUTION INTRAVENOUS at 16:48

## 2024-05-09 ENCOUNTER — OFFICE VISIT (OUTPATIENT)
Dept: CARDIOLOGY | Facility: HOSPITAL | Age: 69
End: 2024-05-09
Payer: MEDICARE

## 2024-05-09 ENCOUNTER — OFFICE VISIT (OUTPATIENT)
Dept: NEUROLOGY | Facility: CLINIC | Age: 69
End: 2024-05-09
Payer: MEDICARE

## 2024-05-09 VITALS
OXYGEN SATURATION: 100 % | DIASTOLIC BLOOD PRESSURE: 64 MMHG | TEMPERATURE: 98.5 F | HEIGHT: 70 IN | BODY MASS INDEX: 23.62 KG/M2 | SYSTOLIC BLOOD PRESSURE: 116 MMHG | HEART RATE: 78 BPM | WEIGHT: 165 LBS

## 2024-05-09 VITALS
DIASTOLIC BLOOD PRESSURE: 83 MMHG | SYSTOLIC BLOOD PRESSURE: 118 MMHG | WEIGHT: 168.25 LBS | OXYGEN SATURATION: 90 % | TEMPERATURE: 97.3 F | HEART RATE: 94 BPM | RESPIRATION RATE: 20 BRPM | BODY MASS INDEX: 25.5 KG/M2 | HEIGHT: 68 IN

## 2024-05-09 DIAGNOSIS — R73.9 HYPERGLYCEMIA, UNSPECIFIED: ICD-10-CM

## 2024-05-09 DIAGNOSIS — E78.2 MIXED HYPERLIPIDEMIA: ICD-10-CM

## 2024-05-09 DIAGNOSIS — R20.0 LEFT SIDED NUMBNESS: ICD-10-CM

## 2024-05-09 DIAGNOSIS — I50.23 ACUTE ON CHRONIC HFREF (HEART FAILURE WITH REDUCED EJECTION FRACTION): Primary | ICD-10-CM

## 2024-05-09 DIAGNOSIS — I25.10 CORONARY ARTERY DISEASE INVOLVING NATIVE CORONARY ARTERY OF NATIVE HEART, UNSPECIFIED WHETHER ANGINA PRESENT: ICD-10-CM

## 2024-05-09 DIAGNOSIS — Z72.0 TOBACCO ABUSE: ICD-10-CM

## 2024-05-09 DIAGNOSIS — I63.9 CEREBROVASCULAR ACCIDENT (CVA), UNSPECIFIED MECHANISM: Primary | ICD-10-CM

## 2024-05-09 DIAGNOSIS — I10 ESSENTIAL HYPERTENSION: ICD-10-CM

## 2024-05-09 DIAGNOSIS — I10 PRIMARY HYPERTENSION: ICD-10-CM

## 2024-05-09 DIAGNOSIS — R06.09 DOE (DYSPNEA ON EXERTION): ICD-10-CM

## 2024-05-09 DIAGNOSIS — R60.0 EDEMA LEG: ICD-10-CM

## 2024-05-09 DIAGNOSIS — I48.21 PERMANENT ATRIAL FIBRILLATION: ICD-10-CM

## 2024-05-09 DIAGNOSIS — I48.11 LONGSTANDING PERSISTENT ATRIAL FIBRILLATION: ICD-10-CM

## 2024-05-09 LAB
QT INTERVAL: 310 MS
QTC INTERVAL: 407 MS

## 2024-05-09 RX ORDER — FUROSEMIDE 40 MG/1
40 TABLET ORAL 2 TIMES DAILY
Qty: 60 TABLET | Refills: 3 | Status: SHIPPED | OUTPATIENT
Start: 2024-05-09

## 2024-05-09 RX ORDER — ALBUTEROL SULFATE 90 UG/1
2 AEROSOL, METERED RESPIRATORY (INHALATION) EVERY 4 HOURS PRN
Qty: 18 G | Refills: 2 | Status: SHIPPED | OUTPATIENT
Start: 2024-05-09

## 2024-05-09 RX ORDER — PANTOPRAZOLE SODIUM 40 MG/1
40 TABLET, DELAYED RELEASE ORAL
COMMUNITY
Start: 2024-04-19

## 2024-05-09 RX ORDER — POTASSIUM CHLORIDE 1500 MG/1
20 TABLET, EXTENDED RELEASE ORAL DAILY
Qty: 30 TABLET | Refills: 3 | Status: SHIPPED | OUTPATIENT
Start: 2024-05-09

## 2024-05-09 RX ORDER — METOPROLOL TARTRATE 100 MG/1
100 TABLET ORAL EVERY 12 HOURS SCHEDULED
Qty: 60 TABLET | Refills: 3 | Status: SHIPPED | OUTPATIENT
Start: 2024-05-09

## 2024-05-09 RX ORDER — BUPROPION HYDROCHLORIDE 150 MG/1
150 TABLET ORAL
COMMUNITY
Start: 2024-04-19

## 2024-05-15 ENCOUNTER — APPOINTMENT (OUTPATIENT)
Dept: GENERAL RADIOLOGY | Facility: HOSPITAL | Age: 69
End: 2024-05-15
Payer: MEDICARE

## 2024-05-15 ENCOUNTER — HOSPITAL ENCOUNTER (OUTPATIENT)
Facility: HOSPITAL | Age: 69
Setting detail: OBSERVATION
Discharge: HOME | End: 2024-05-16
Attending: STUDENT IN AN ORGANIZED HEALTH CARE EDUCATION/TRAINING PROGRAM | Admitting: STUDENT IN AN ORGANIZED HEALTH CARE EDUCATION/TRAINING PROGRAM
Payer: MEDICARE

## 2024-05-15 DIAGNOSIS — I50.22 CHRONIC SYSTOLIC HEART FAILURE: ICD-10-CM

## 2024-05-15 DIAGNOSIS — I50.9 ACUTE ON CHRONIC CONGESTIVE HEART FAILURE, UNSPECIFIED HEART FAILURE TYPE: Primary | ICD-10-CM

## 2024-05-15 PROBLEM — I50.33 ACUTE ON CHRONIC HEART FAILURE WITH PRESERVED EJECTION FRACTION (HFPEF): Status: ACTIVE | Noted: 2024-05-15

## 2024-05-15 LAB
A-A DO2: 33 MMHG (ref 0–300)
ABSOLUTE LUNG FLUID CONTENT: 26 % (ref 20–35)
ALBUMIN SERPL-MCNC: 3.8 G/DL (ref 3.5–5.2)
ALBUMIN/GLOB SERPL: 1.1 G/DL
ALP SERPL-CCNC: 111 U/L (ref 39–117)
ALT SERPL W P-5'-P-CCNC: 374 U/L (ref 1–41)
ANION GAP SERPL CALCULATED.3IONS-SCNC: 12.6 MMOL/L (ref 5–15)
APTT PPP: 36.4 SECONDS (ref 26.5–34.5)
ARTERIAL PATENCY WRIST A: ABNORMAL
AST SERPL-CCNC: 159 U/L (ref 1–40)
ATMOSPHERIC PRESS: 720 MMHG
BACTERIA UR QL AUTO: NORMAL /HPF
BASE EXCESS BLDA CALC-SCNC: -1.7 MMOL/L (ref 0–2)
BASOPHILS # BLD AUTO: 0.05 10*3/MM3 (ref 0–0.2)
BASOPHILS NFR BLD AUTO: 0.5 % (ref 0–1.5)
BDY SITE: ABNORMAL
BILIRUB SERPL-MCNC: 1.3 MG/DL (ref 0–1.2)
BILIRUB UR QL STRIP: ABNORMAL
BUN SERPL-MCNC: 19 MG/DL (ref 8–23)
BUN/CREAT SERPL: 18.8 (ref 7–25)
CALCIUM SPEC-SCNC: 8.8 MG/DL (ref 8.6–10.5)
CHLORIDE SERPL-SCNC: 105 MMOL/L (ref 98–107)
CLARITY UR: CLEAR
CO2 BLDA-SCNC: 24.3 MMOL/L (ref 22–33)
CO2 SERPL-SCNC: 25.4 MMOL/L (ref 22–29)
COHGB MFR BLD: 2.4 % (ref 0–5)
COLOR UR: ABNORMAL
CREAT SERPL-MCNC: 1.01 MG/DL (ref 0.76–1.27)
D-LACTATE SERPL-SCNC: 2.3 MMOL/L (ref 0.5–2)
D-LACTATE SERPL-SCNC: 2.4 MMOL/L (ref 0.5–2)
D-LACTATE SERPL-SCNC: 3.1 MMOL/L (ref 0.5–2)
D-LACTATE SERPL-SCNC: 3.5 MMOL/L (ref 0.5–2)
DEPRECATED RDW RBC AUTO: 50 FL (ref 37–54)
EGFRCR SERPLBLD CKD-EPI 2021: 79.5 ML/MIN/1.73
EOSINOPHIL # BLD AUTO: 0.01 10*3/MM3 (ref 0–0.4)
EOSINOPHIL NFR BLD AUTO: 0.1 % (ref 0.3–6.2)
ERYTHROCYTE [DISTWIDTH] IN BLOOD BY AUTOMATED COUNT: 15.8 % (ref 12.3–15.4)
FLUAV RNA RESP QL NAA+PROBE: NOT DETECTED
FLUBV RNA RESP QL NAA+PROBE: NOT DETECTED
GEN 5 2HR TROPONIN T REFLEX: 45 NG/L
GLOBULIN UR ELPH-MCNC: 3.4 GM/DL
GLUCOSE SERPL-MCNC: 163 MG/DL (ref 65–99)
GLUCOSE UR STRIP-MCNC: NEGATIVE MG/DL
HCO3 BLDA-SCNC: 23.1 MMOL/L (ref 20–26)
HCT VFR BLD AUTO: 39.3 % (ref 37.5–51)
HCT VFR BLD CALC: 35.4 % (ref 38–51)
HGB BLD-MCNC: 11.9 G/DL (ref 13–17.7)
HGB BLDA-MCNC: 11.5 G/DL (ref 14–18)
HGB UR QL STRIP.AUTO: NEGATIVE
HOLD SPECIMEN: NORMAL
HYALINE CASTS UR QL AUTO: NORMAL /LPF
IMM GRANULOCYTES # BLD AUTO: 0.04 10*3/MM3 (ref 0–0.05)
IMM GRANULOCYTES NFR BLD AUTO: 0.4 % (ref 0–0.5)
INHALED O2 CONCENTRATION: 21 %
INR PPP: 1.46 (ref 0.9–1.1)
KETONES UR QL STRIP: NEGATIVE
LEUKOCYTE ESTERASE UR QL STRIP.AUTO: NEGATIVE
LYMPHOCYTES # BLD AUTO: 2.05 10*3/MM3 (ref 0.7–3.1)
LYMPHOCYTES NFR BLD AUTO: 21.9 % (ref 19.6–45.3)
Lab: ABNORMAL
MCH RBC QN AUTO: 26.4 PG (ref 26.6–33)
MCHC RBC AUTO-ENTMCNC: 30.3 G/DL (ref 31.5–35.7)
MCV RBC AUTO: 87.1 FL (ref 79–97)
METHGB BLD QL: 0 % (ref 0–3)
MODALITY: ABNORMAL
MONOCYTES # BLD AUTO: 1.1 10*3/MM3 (ref 0.1–0.9)
MONOCYTES NFR BLD AUTO: 11.8 % (ref 5–12)
NEUTROPHILS NFR BLD AUTO: 6.1 10*3/MM3 (ref 1.7–7)
NEUTROPHILS NFR BLD AUTO: 65.3 % (ref 42.7–76)
NITRITE UR QL STRIP: NEGATIVE
NRBC BLD AUTO-RTO: 0.6 /100 WBC (ref 0–0.2)
NT-PROBNP SERPL-MCNC: ABNORMAL PG/ML (ref 0–900)
OXYHGB MFR BLDV: 89.1 % (ref 94–99)
PCO2 BLDA: 38.7 MM HG (ref 35–45)
PCO2 TEMP ADJ BLD: ABNORMAL MM[HG]
PH BLDA: 7.38 PH UNITS (ref 7.35–7.45)
PH UR STRIP.AUTO: 5.5 [PH] (ref 5–8)
PH, TEMP CORRECTED: ABNORMAL
PLATELET # BLD AUTO: 223 10*3/MM3 (ref 140–450)
PMV BLD AUTO: 11.6 FL (ref 6–12)
PO2 BLDA: 64.7 MM HG (ref 83–108)
PO2 TEMP ADJ BLD: ABNORMAL MM[HG]
POTASSIUM SERPL-SCNC: 4.5 MMOL/L (ref 3.5–5.2)
PROT SERPL-MCNC: 7.2 G/DL (ref 6–8.5)
PROT UR QL STRIP: ABNORMAL
PROTHROMBIN TIME: 17.8 SECONDS (ref 12.1–14.7)
QT INTERVAL: 402 MS
QTC INTERVAL: 505 MS
RBC # BLD AUTO: 4.51 10*6/MM3 (ref 4.14–5.8)
RBC # UR STRIP: NORMAL /HPF
REF LAB TEST METHOD: NORMAL
SAO2 % BLDCOA: 91.3 % (ref 94–99)
SARS-COV-2 RNA RESP QL NAA+PROBE: NOT DETECTED
SODIUM SERPL-SCNC: 143 MMOL/L (ref 136–145)
SP GR UR STRIP: 1.02 (ref 1–1.03)
SQUAMOUS #/AREA URNS HPF: NORMAL /HPF
TROPONIN T DELTA: -2 NG/L
TROPONIN T SERPL HS-MCNC: 47 NG/L
UFH PPP CHRO-ACNC: 0.42 IU/ML (ref 0.3–0.7)
UROBILINOGEN UR QL STRIP: ABNORMAL
VENTILATOR MODE: ABNORMAL
WBC # UR STRIP: NORMAL /HPF
WBC NRBC COR # BLD AUTO: 9.35 10*3/MM3 (ref 3.4–10.8)
WHOLE BLOOD HOLD COAG: NORMAL
WHOLE BLOOD HOLD SPECIMEN: NORMAL

## 2024-05-15 PROCEDURE — 80053 COMPREHEN METABOLIC PANEL: CPT | Performed by: PHYSICIAN ASSISTANT

## 2024-05-15 PROCEDURE — G0378 HOSPITAL OBSERVATION PER HR: HCPCS

## 2024-05-15 PROCEDURE — 82805 BLOOD GASES W/O2 SATURATION: CPT

## 2024-05-15 PROCEDURE — 93005 ELECTROCARDIOGRAM TRACING: CPT | Performed by: PHYSICIAN ASSISTANT

## 2024-05-15 PROCEDURE — 96375 TX/PRO/DX INJ NEW DRUG ADDON: CPT

## 2024-05-15 PROCEDURE — 93010 ELECTROCARDIOGRAM REPORT: CPT | Performed by: INTERNAL MEDICINE

## 2024-05-15 PROCEDURE — 36600 WITHDRAWAL OF ARTERIAL BLOOD: CPT

## 2024-05-15 PROCEDURE — 99285 EMERGENCY DEPT VISIT HI MDM: CPT

## 2024-05-15 PROCEDURE — 25010000002 HEPARIN (PORCINE) 25000-0.45 UT/250ML-% SOLUTION: Performed by: STUDENT IN AN ORGANIZED HEALTH CARE EDUCATION/TRAINING PROGRAM

## 2024-05-15 PROCEDURE — 96365 THER/PROPH/DIAG IV INF INIT: CPT

## 2024-05-15 PROCEDURE — 71045 X-RAY EXAM CHEST 1 VIEW: CPT | Performed by: RADIOLOGY

## 2024-05-15 PROCEDURE — 87636 SARSCOV2 & INF A&B AMP PRB: CPT | Performed by: PHYSICIAN ASSISTANT

## 2024-05-15 PROCEDURE — 25010000002 FUROSEMIDE PER 20 MG: Performed by: PHYSICIAN ASSISTANT

## 2024-05-15 PROCEDURE — 85520 HEPARIN ASSAY: CPT | Performed by: STUDENT IN AN ORGANIZED HEALTH CARE EDUCATION/TRAINING PROGRAM

## 2024-05-15 PROCEDURE — 96366 THER/PROPH/DIAG IV INF ADDON: CPT

## 2024-05-15 PROCEDURE — 85025 COMPLETE CBC W/AUTO DIFF WBC: CPT | Performed by: PHYSICIAN ASSISTANT

## 2024-05-15 PROCEDURE — 84484 ASSAY OF TROPONIN QUANT: CPT | Performed by: PHYSICIAN ASSISTANT

## 2024-05-15 PROCEDURE — 99223 1ST HOSP IP/OBS HIGH 75: CPT

## 2024-05-15 PROCEDURE — 82375 ASSAY CARBOXYHB QUANT: CPT

## 2024-05-15 PROCEDURE — 71045 X-RAY EXAM CHEST 1 VIEW: CPT

## 2024-05-15 PROCEDURE — 83880 ASSAY OF NATRIURETIC PEPTIDE: CPT | Performed by: PHYSICIAN ASSISTANT

## 2024-05-15 PROCEDURE — 94726 PLETHYSMOGRAPHY LUNG VOLUMES: CPT

## 2024-05-15 PROCEDURE — 96376 TX/PRO/DX INJ SAME DRUG ADON: CPT

## 2024-05-15 PROCEDURE — 87040 BLOOD CULTURE FOR BACTERIA: CPT | Performed by: STUDENT IN AN ORGANIZED HEALTH CARE EDUCATION/TRAINING PROGRAM

## 2024-05-15 PROCEDURE — 85730 THROMBOPLASTIN TIME PARTIAL: CPT | Performed by: STUDENT IN AN ORGANIZED HEALTH CARE EDUCATION/TRAINING PROGRAM

## 2024-05-15 PROCEDURE — 36415 COLL VENOUS BLD VENIPUNCTURE: CPT | Performed by: STUDENT IN AN ORGANIZED HEALTH CARE EDUCATION/TRAINING PROGRAM

## 2024-05-15 PROCEDURE — 25010000002 FUROSEMIDE PER 20 MG: Performed by: STUDENT IN AN ORGANIZED HEALTH CARE EDUCATION/TRAINING PROGRAM

## 2024-05-15 PROCEDURE — 81001 URINALYSIS AUTO W/SCOPE: CPT | Performed by: PHYSICIAN ASSISTANT

## 2024-05-15 PROCEDURE — 83605 ASSAY OF LACTIC ACID: CPT | Performed by: STUDENT IN AN ORGANIZED HEALTH CARE EDUCATION/TRAINING PROGRAM

## 2024-05-15 PROCEDURE — 83050 HGB METHEMOGLOBIN QUAN: CPT

## 2024-05-15 PROCEDURE — 85610 PROTHROMBIN TIME: CPT | Performed by: STUDENT IN AN ORGANIZED HEALTH CARE EDUCATION/TRAINING PROGRAM

## 2024-05-15 RX ORDER — ASPIRIN 81 MG/1
81 TABLET ORAL DAILY
Status: DISCONTINUED | OUTPATIENT
Start: 2024-05-16 | End: 2024-05-16

## 2024-05-15 RX ORDER — ROSUVASTATIN CALCIUM 10 MG/1
10 TABLET, COATED ORAL NIGHTLY
Status: DISCONTINUED | OUTPATIENT
Start: 2024-05-15 | End: 2024-05-16 | Stop reason: HOSPADM

## 2024-05-15 RX ORDER — AMOXICILLIN 250 MG
2 CAPSULE ORAL 2 TIMES DAILY PRN
Status: DISCONTINUED | OUTPATIENT
Start: 2024-05-15 | End: 2024-05-16 | Stop reason: HOSPADM

## 2024-05-15 RX ORDER — HEPARIN SODIUM 10000 [USP'U]/100ML
10 INJECTION, SOLUTION INTRAVENOUS
Status: DISCONTINUED | OUTPATIENT
Start: 2024-05-15 | End: 2024-05-16

## 2024-05-15 RX ORDER — ROPINIROLE 1 MG/1
1 TABLET, FILM COATED ORAL NIGHTLY
Status: DISCONTINUED | OUTPATIENT
Start: 2024-05-15 | End: 2024-05-16 | Stop reason: HOSPADM

## 2024-05-15 RX ORDER — HEPARIN SODIUM 5000 [USP'U]/ML
50 INJECTION, SOLUTION INTRAVENOUS; SUBCUTANEOUS AS NEEDED
Status: DISCONTINUED | OUTPATIENT
Start: 2024-05-15 | End: 2024-05-15

## 2024-05-15 RX ORDER — POTASSIUM CHLORIDE 1500 MG/1
20 TABLET, EXTENDED RELEASE ORAL DAILY
Status: CANCELLED | OUTPATIENT
Start: 2024-05-15

## 2024-05-15 RX ORDER — CLOPIDOGREL BISULFATE 75 MG/1
75 TABLET ORAL DAILY
Status: DISCONTINUED | OUTPATIENT
Start: 2024-05-16 | End: 2024-05-16 | Stop reason: HOSPADM

## 2024-05-15 RX ORDER — NITROGLYCERIN 0.4 MG/1
0.4 TABLET SUBLINGUAL
Status: DISCONTINUED | OUTPATIENT
Start: 2024-05-15 | End: 2024-05-16 | Stop reason: HOSPADM

## 2024-05-15 RX ORDER — MECLIZINE HCL 25MG 25 MG/1
25 TABLET, CHEWABLE ORAL EVERY 6 HOURS PRN
COMMUNITY

## 2024-05-15 RX ORDER — OXYCODONE AND ACETAMINOPHEN 10; 325 MG/1; MG/1
1 TABLET ORAL ONCE
Status: COMPLETED | OUTPATIENT
Start: 2024-05-15 | End: 2024-05-15

## 2024-05-15 RX ORDER — FUROSEMIDE 10 MG/ML
80 INJECTION INTRAMUSCULAR; INTRAVENOUS ONCE
Status: COMPLETED | OUTPATIENT
Start: 2024-05-15 | End: 2024-05-15

## 2024-05-15 RX ORDER — OXYCODONE AND ACETAMINOPHEN 10; 325 MG/1; MG/1
1 TABLET ORAL EVERY 6 HOURS PRN
Status: DISCONTINUED | OUTPATIENT
Start: 2024-05-15 | End: 2024-05-16 | Stop reason: HOSPADM

## 2024-05-15 RX ORDER — FUROSEMIDE 40 MG/1
40 TABLET ORAL 2 TIMES DAILY
Status: CANCELLED | OUTPATIENT
Start: 2024-05-15

## 2024-05-15 RX ORDER — FUROSEMIDE 10 MG/ML
40 INJECTION INTRAMUSCULAR; INTRAVENOUS 2 TIMES DAILY
Status: DISCONTINUED | OUTPATIENT
Start: 2024-05-15 | End: 2024-05-16

## 2024-05-15 RX ORDER — ROSUVASTATIN CALCIUM 10 MG/1
10 TABLET, COATED ORAL NIGHTLY
Status: ON HOLD | COMMUNITY
End: 2024-05-16

## 2024-05-15 RX ORDER — BISACODYL 10 MG
10 SUPPOSITORY, RECTAL RECTAL DAILY PRN
Status: DISCONTINUED | OUTPATIENT
Start: 2024-05-15 | End: 2024-05-16 | Stop reason: HOSPADM

## 2024-05-15 RX ORDER — POLYETHYLENE GLYCOL 3350 17 G/17G
17 POWDER, FOR SOLUTION ORAL DAILY PRN
Status: DISCONTINUED | OUTPATIENT
Start: 2024-05-15 | End: 2024-05-16 | Stop reason: HOSPADM

## 2024-05-15 RX ORDER — FLUTICASONE PROPIONATE 50 MCG
2 SPRAY, SUSPENSION (ML) NASAL DAILY
Status: DISCONTINUED | OUTPATIENT
Start: 2024-05-16 | End: 2024-05-16 | Stop reason: HOSPADM

## 2024-05-15 RX ORDER — ASPIRIN 81 MG/1
81 TABLET ORAL DAILY
Status: CANCELLED | OUTPATIENT
Start: 2024-05-15

## 2024-05-15 RX ORDER — OXYCODONE AND ACETAMINOPHEN 10; 325 MG/1; MG/1
1 TABLET ORAL EVERY 6 HOURS PRN
Status: CANCELLED | OUTPATIENT
Start: 2024-05-15

## 2024-05-15 RX ORDER — PANTOPRAZOLE SODIUM 40 MG/1
40 TABLET, DELAYED RELEASE ORAL
Status: DISCONTINUED | OUTPATIENT
Start: 2024-05-16 | End: 2024-05-16 | Stop reason: HOSPADM

## 2024-05-15 RX ORDER — MECLIZINE HYDROCHLORIDE 25 MG/1
25 TABLET ORAL EVERY 6 HOURS PRN
Status: CANCELLED | OUTPATIENT
Start: 2024-05-15

## 2024-05-15 RX ORDER — SODIUM CHLORIDE 0.9 % (FLUSH) 0.9 %
10 SYRINGE (ML) INJECTION AS NEEDED
Status: DISCONTINUED | OUTPATIENT
Start: 2024-05-15 | End: 2024-05-16 | Stop reason: HOSPADM

## 2024-05-15 RX ORDER — SODIUM CHLORIDE 0.9 % (FLUSH) 0.9 %
10 SYRINGE (ML) INJECTION EVERY 12 HOURS SCHEDULED
Status: DISCONTINUED | OUTPATIENT
Start: 2024-05-15 | End: 2024-05-16 | Stop reason: HOSPADM

## 2024-05-15 RX ORDER — ALBUTEROL SULFATE 2.5 MG/3ML
2.5 SOLUTION RESPIRATORY (INHALATION) EVERY 4 HOURS PRN
Status: DISCONTINUED | OUTPATIENT
Start: 2024-05-15 | End: 2024-05-16 | Stop reason: HOSPADM

## 2024-05-15 RX ORDER — HEPARIN SODIUM 5000 [USP'U]/ML
25 INJECTION, SOLUTION INTRAVENOUS; SUBCUTANEOUS AS NEEDED
Status: DISCONTINUED | OUTPATIENT
Start: 2024-05-15 | End: 2024-05-15

## 2024-05-15 RX ORDER — PREGABALIN 75 MG/1
75 CAPSULE ORAL 2 TIMES DAILY
Status: DISCONTINUED | OUTPATIENT
Start: 2024-05-15 | End: 2024-05-16 | Stop reason: HOSPADM

## 2024-05-15 RX ORDER — METOPROLOL TARTRATE 100 MG/1
100 TABLET ORAL EVERY 12 HOURS SCHEDULED
Status: DISCONTINUED | OUTPATIENT
Start: 2024-05-15 | End: 2024-05-16

## 2024-05-15 RX ORDER — ASPIRIN 81 MG/1
81 TABLET ORAL DAILY
COMMUNITY
End: 2024-05-16 | Stop reason: HOSPADM

## 2024-05-15 RX ORDER — BISACODYL 5 MG/1
5 TABLET, DELAYED RELEASE ORAL DAILY PRN
Status: DISCONTINUED | OUTPATIENT
Start: 2024-05-15 | End: 2024-05-16 | Stop reason: HOSPADM

## 2024-05-15 RX ORDER — SODIUM CHLORIDE 9 MG/ML
40 INJECTION, SOLUTION INTRAVENOUS AS NEEDED
Status: DISCONTINUED | OUTPATIENT
Start: 2024-05-15 | End: 2024-05-16 | Stop reason: HOSPADM

## 2024-05-15 RX ORDER — BUPROPION HYDROCHLORIDE 150 MG/1
150 TABLET ORAL EVERY MORNING
Status: DISCONTINUED | OUTPATIENT
Start: 2024-05-16 | End: 2024-05-16 | Stop reason: HOSPADM

## 2024-05-15 RX ADMIN — FUROSEMIDE 40 MG: 10 INJECTION, SOLUTION INTRAMUSCULAR; INTRAVENOUS at 21:00

## 2024-05-15 RX ADMIN — OXYCODONE AND ACETAMINOPHEN 1 TABLET: 10; 325 TABLET ORAL at 12:44

## 2024-05-15 RX ADMIN — PREGABALIN 75 MG: 75 CAPSULE ORAL at 21:02

## 2024-05-15 RX ADMIN — ROPINIROLE HYDROCHLORIDE 1 MG: 1 TABLET, FILM COATED ORAL at 21:02

## 2024-05-15 RX ADMIN — Medication 10 ML: at 20:26

## 2024-05-15 RX ADMIN — OXYCODONE HYDROCHLORIDE AND ACETAMINOPHEN 1 TABLET: 10; 325 TABLET ORAL at 21:02

## 2024-05-15 RX ADMIN — HEPARIN SODIUM 10 UNITS/KG/HR: 10000 INJECTION, SOLUTION INTRAVENOUS at 22:06

## 2024-05-15 RX ADMIN — FUROSEMIDE 80 MG: 10 INJECTION, SOLUTION INTRAMUSCULAR; INTRAVENOUS at 12:19

## 2024-05-15 RX ADMIN — METOPROLOL TARTRATE 100 MG: 100 TABLET, FILM COATED ORAL at 21:02

## 2024-05-15 RX ADMIN — ROSUVASTATIN CALCIUM 10 MG: 10 TABLET, FILM COATED ORAL at 21:02

## 2024-05-15 NOTE — CASE MANAGEMENT/SOCIAL WORK
Discharge Planning Assessment   [place]     Patient Name: Eliud Crouch  MRN: [id number]  Today's Date: 5/15/2024    Admit Date: 5/15/2024    Plan: Spoke with patient and MEE Bruno at bedside.  Patient lives alone and will return home at discharge. Patient has no POA or Living Will. Patient uses no DME, Oxygen or HH. Patient PCP Jorge Alberto Alonso and pharmacy [place], [place]. Patient denies any needs at this time. Patients family will transport home at discharge.   Discharge Needs Assessment       Row Name 05/15/24 1321       Living Environment    People in Home alone    Potentially Unsafe Housing Conditions none    In the past 12 months has the electric, gas, oil, or water company threatened to shut off services in your home? No    Primary Care Provided by self    Provides Primary Care For no one    Family Caregiver if Needed other relative(s)    Family Caregiver Names Damion Jorge Relative/ [phone]    Quality of Family Relationships helpful;involved;supportive    Able to Return to Prior Arrangements yes       Resource/Environmental Concerns    Resource/Environmental Concerns none    Transportation Concerns none       Transportation Needs    In the past 12 months, has lack of transportation kept you from medical appointments or from getting medications? no    In the past 12 months, has lack of transportation kept you from meetings, work, or from getting things needed for daily living? No       Food Insecurity    Within the past 12 months, you worried that your food would run out before you got the money to buy more. Never true    Within the past 12 months, the food you bought just didn't last and you didn't have money to get more. Never true       Transition Planning    Patient/Family Anticipates Transition to home    Patient/Family Anticipated Services at Transition none    Transportation Anticipated family or friend will provide       Discharge Needs Assessment    Readmission Within  the Last 30 Days no previous admission in last 30 days    Equipment Currently Used at Home none    Concerns to be Addressed discharge planning    Anticipated Changes Related to Illness none    Equipment Needed After Discharge none                   Discharge Plan       Row Name 05/15/24 5835       Plan    Plan Spoke with patient and MEE Bruno at bedside.  Patient lives alone and will return home at discharge. Patient has no POA or Living Will. Patient uses no DME, Oxygen or HH. Patient PCP Jorge Alberto Alonso and pharmacy [place], [place]. Patient denies any needs at this time. Patients family will transport home at discharge.    Patient/Family in Agreement with Plan yes                     Micaela Mclaughlin

## 2024-05-15 NOTE — PLAN OF CARE
Goal Outcome Evaluation:            Received pt from ED alert oriented and in no distress.
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
## 2024-05-15 NOTE — PLAN OF CARE
Goal Outcome Evaluation:         Daily Care Plan Summary: Heart Failure    Diuretic in use (IV or PO):   IV      Daily weight (up or down):    new admission      Output > Intake (yes/no):yes      O2 Requirements (current, any change?): room air      Symptoms noted with Activity (Respiratory Tolerance, functional state):    tolerating activity with little shortness of air      Anticipated Discharge Plans:    home with improvement

## 2024-05-16 ENCOUNTER — APPOINTMENT (OUTPATIENT)
Dept: NUCLEAR MEDICINE | Facility: HOSPITAL | Age: 69
End: 2024-05-16
Payer: MEDICARE

## 2024-05-16 ENCOUNTER — APPOINTMENT (OUTPATIENT)
Dept: CARDIOLOGY | Facility: HOSPITAL | Age: 69
End: 2024-05-16
Payer: MEDICARE

## 2024-05-16 ENCOUNTER — READMISSION MANAGEMENT (OUTPATIENT)
Dept: CALL CENTER | Facility: HOSPITAL | Age: 69
End: 2024-05-16
Payer: MEDICARE

## 2024-05-16 VITALS
HEIGHT: 68 IN | SYSTOLIC BLOOD PRESSURE: 115 MMHG | WEIGHT: 163.7 LBS | TEMPERATURE: 98.7 F | BODY MASS INDEX: 24.81 KG/M2 | HEART RATE: 69 BPM | RESPIRATION RATE: 20 BRPM | OXYGEN SATURATION: 96 % | DIASTOLIC BLOOD PRESSURE: 73 MMHG

## 2024-05-16 LAB
ANION GAP SERPL CALCULATED.3IONS-SCNC: 10.8 MMOL/L (ref 5–15)
BH CV ECHO MEAS - AO MAX PG: 2.6 MMHG
BH CV ECHO MEAS - AO MEAN PG: 1 MMHG
BH CV ECHO MEAS - AO ROOT DIAM: 3.5 CM
BH CV ECHO MEAS - AO V2 MAX: 80.2 CM/SEC
BH CV ECHO MEAS - AO V2 VTI: 11.7 CM
BH CV ECHO MEAS - EDV(CUBED): 194.1 ML
BH CV ECHO MEAS - EDV(MOD-SP4): 118 ML
BH CV ECHO MEAS - EF(MOD-SP4): 30.3 %
BH CV ECHO MEAS - ESV(CUBED): 121.3 ML
BH CV ECHO MEAS - ESV(MOD-SP4): 82.2 ML
BH CV ECHO MEAS - FS: 14.5 %
BH CV ECHO MEAS - IVS/LVPW: 1.43 CM
BH CV ECHO MEAS - IVSD: 1.41 CM
BH CV ECHO MEAS - LA DIMENSION: 6 CM
BH CV ECHO MEAS - LAT PEAK E' VEL: 7.7 CM/SEC
BH CV ECHO MEAS - LV DIASTOLIC VOL/BSA (35-75): 62.7 CM2
BH CV ECHO MEAS - LV MASS(C)D: 296 GRAMS
BH CV ECHO MEAS - LV SYSTOLIC VOL/BSA (12-30): 43.6 CM2
BH CV ECHO MEAS - LVIDD: 5.8 CM
BH CV ECHO MEAS - LVIDS: 5 CM
BH CV ECHO MEAS - LVOT AREA: 3.5 CM2
BH CV ECHO MEAS - LVOT DIAM: 2.1 CM
BH CV ECHO MEAS - LVPWD: 0.99 CM
BH CV ECHO MEAS - MED PEAK E' VEL: 5.4 CM/SEC
BH CV ECHO MEAS - MV A MAX VEL: 30.6 CM/SEC
BH CV ECHO MEAS - MV E MAX VEL: 134 CM/SEC
BH CV ECHO MEAS - MV E/A: 4.4
BH CV ECHO MEAS - PA ACC TIME: 0.08 SEC
BH CV ECHO MEAS - RAP SYSTOLE: 10 MMHG
BH CV ECHO MEAS - RVSP: 47.2 MMHG
BH CV ECHO MEAS - SV(MOD-SP4): 35.8 ML
BH CV ECHO MEAS - SVI(MOD-SP4): 19 ML/M2
BH CV ECHO MEAS - TAPSE (>1.6): 0.46 CM
BH CV ECHO MEAS - TR MAX PG: 37.2 MMHG
BH CV ECHO MEAS - TR MAX VEL: 305 CM/SEC
BH CV ECHO MEASUREMENTS AVERAGE E/E' RATIO: 20.46
BH CV NUCLEAR PRIOR STUDY: 3
BH CV REST NUCLEAR ISOTOPE DOSE: 10.7 MCI
BH CV STRESS BP STAGE 1: NORMAL
BH CV STRESS COMMENTS STAGE 1: NORMAL
BH CV STRESS DOSE REGADENOSON STAGE 1: 0.4
BH CV STRESS DURATION MIN STAGE 1: 0
BH CV STRESS DURATION SEC STAGE 1: 10
BH CV STRESS HR STAGE 1: 95
BH CV STRESS NUCLEAR ISOTOPE DOSE: 30.7 MCI
BH CV STRESS PROTOCOL 1: NORMAL
BH CV STRESS RECOVERY BP: NORMAL MMHG
BH CV STRESS RECOVERY HR: 80 BPM
BH CV STRESS STAGE 1: 1
BUN SERPL-MCNC: 23 MG/DL (ref 8–23)
BUN/CREAT SERPL: 18.7 (ref 7–25)
CALCIUM SPEC-SCNC: 8.6 MG/DL (ref 8.6–10.5)
CHLORIDE SERPL-SCNC: 103 MMOL/L (ref 98–107)
CO2 SERPL-SCNC: 27.2 MMOL/L (ref 22–29)
CREAT SERPL-MCNC: 1.23 MG/DL (ref 0.76–1.27)
D-LACTATE SERPL-SCNC: 1.6 MMOL/L (ref 0.5–2)
DEPRECATED RDW RBC AUTO: 47.8 FL (ref 37–54)
EGFRCR SERPLBLD CKD-EPI 2021: 62.8 ML/MIN/1.73
ERYTHROCYTE [DISTWIDTH] IN BLOOD BY AUTOMATED COUNT: 15.8 % (ref 12.3–15.4)
GLUCOSE SERPL-MCNC: 118 MG/DL (ref 65–99)
HCT VFR BLD AUTO: 35.8 % (ref 37.5–51)
HGB BLD-MCNC: 11 G/DL (ref 13–17.7)
LEFT ATRIUM VOLUME INDEX: 64.9 ML/M2
LV EF NUC BP: 34 %
MAXIMAL PREDICTED HEART RATE: 149 BPM
MCH RBC QN AUTO: 25.9 PG (ref 26.6–33)
MCHC RBC AUTO-ENTMCNC: 30.7 G/DL (ref 31.5–35.7)
MCV RBC AUTO: 84.2 FL (ref 79–97)
PERCENT MAX PREDICTED HR: 63.76 %
PLATELET # BLD AUTO: 193 10*3/MM3 (ref 140–450)
PMV BLD AUTO: 11.2 FL (ref 6–12)
POTASSIUM SERPL-SCNC: 4.4 MMOL/L (ref 3.5–5.2)
RBC # BLD AUTO: 4.25 10*6/MM3 (ref 4.14–5.8)
SODIUM SERPL-SCNC: 141 MMOL/L (ref 136–145)
STRESS BASELINE BP: NORMAL MMHG
STRESS BASELINE HR: 104 BPM
STRESS PERCENT HR: 75 %
STRESS POST PEAK BP: NORMAL MMHG
STRESS POST PEAK HR: 95 BPM
STRESS TARGET HR: 127 BPM
UFH PPP CHRO-ACNC: 0.22 IU/ML (ref 0.3–0.7)
UFH PPP CHRO-ACNC: 0.33 IU/ML (ref 0.3–0.7)
WBC NRBC COR # BLD AUTO: 9.96 10*3/MM3 (ref 3.4–10.8)

## 2024-05-16 PROCEDURE — 85520 HEPARIN ASSAY: CPT

## 2024-05-16 PROCEDURE — 94799 UNLISTED PULMONARY SVC/PX: CPT

## 2024-05-16 PROCEDURE — A9500 TC99M SESTAMIBI: HCPCS | Performed by: STUDENT IN AN ORGANIZED HEALTH CARE EDUCATION/TRAINING PROGRAM

## 2024-05-16 PROCEDURE — 99239 HOSP IP/OBS DSCHRG MGMT >30: CPT | Performed by: STUDENT IN AN ORGANIZED HEALTH CARE EDUCATION/TRAINING PROGRAM

## 2024-05-16 PROCEDURE — 83605 ASSAY OF LACTIC ACID: CPT | Performed by: STUDENT IN AN ORGANIZED HEALTH CARE EDUCATION/TRAINING PROGRAM

## 2024-05-16 PROCEDURE — 78452 HT MUSCLE IMAGE SPECT MULT: CPT

## 2024-05-16 PROCEDURE — 85027 COMPLETE CBC AUTOMATED: CPT | Performed by: STUDENT IN AN ORGANIZED HEALTH CARE EDUCATION/TRAINING PROGRAM

## 2024-05-16 PROCEDURE — 25010000002 HEPARIN (PORCINE) 25000-0.45 UT/250ML-% SOLUTION: Performed by: STUDENT IN AN ORGANIZED HEALTH CARE EDUCATION/TRAINING PROGRAM

## 2024-05-16 PROCEDURE — G0378 HOSPITAL OBSERVATION PER HR: HCPCS

## 2024-05-16 PROCEDURE — 99204 OFFICE O/P NEW MOD 45 MIN: CPT | Performed by: SPECIALIST

## 2024-05-16 PROCEDURE — 93306 TTE W/DOPPLER COMPLETE: CPT | Performed by: SPECIALIST

## 2024-05-16 PROCEDURE — 93017 CV STRESS TEST TRACING ONLY: CPT

## 2024-05-16 PROCEDURE — 93018 CV STRESS TEST I&R ONLY: CPT | Performed by: SPECIALIST

## 2024-05-16 PROCEDURE — 25010000002 REGADENOSON 0.4 MG/5ML SOLUTION: Performed by: STUDENT IN AN ORGANIZED HEALTH CARE EDUCATION/TRAINING PROGRAM

## 2024-05-16 PROCEDURE — 96366 THER/PROPH/DIAG IV INF ADDON: CPT

## 2024-05-16 PROCEDURE — 0 TECHNETIUM SESTAMIBI: Performed by: STUDENT IN AN ORGANIZED HEALTH CARE EDUCATION/TRAINING PROGRAM

## 2024-05-16 PROCEDURE — 78452 HT MUSCLE IMAGE SPECT MULT: CPT | Performed by: SPECIALIST

## 2024-05-16 PROCEDURE — 85520 HEPARIN ASSAY: CPT | Performed by: STUDENT IN AN ORGANIZED HEALTH CARE EDUCATION/TRAINING PROGRAM

## 2024-05-16 PROCEDURE — 25010000002 FUROSEMIDE PER 20 MG: Performed by: STUDENT IN AN ORGANIZED HEALTH CARE EDUCATION/TRAINING PROGRAM

## 2024-05-16 PROCEDURE — 96376 TX/PRO/DX INJ SAME DRUG ADON: CPT

## 2024-05-16 PROCEDURE — 93306 TTE W/DOPPLER COMPLETE: CPT

## 2024-05-16 PROCEDURE — 80048 BASIC METABOLIC PNL TOTAL CA: CPT | Performed by: STUDENT IN AN ORGANIZED HEALTH CARE EDUCATION/TRAINING PROGRAM

## 2024-05-16 RX ORDER — FUROSEMIDE 20 MG/1
60 TABLET ORAL DAILY
Qty: 90 TABLET | Refills: 0 | Status: SHIPPED | OUTPATIENT
Start: 2024-05-17 | End: 2024-06-16

## 2024-05-16 RX ORDER — CLOPIDOGREL BISULFATE 75 MG/1
75 TABLET ORAL DAILY
Qty: 30 TABLET | Refills: 0 | Status: SHIPPED | OUTPATIENT
Start: 2024-05-16 | End: 2024-06-15

## 2024-05-16 RX ORDER — METOPROLOL SUCCINATE 100 MG/1
100 TABLET, EXTENDED RELEASE ORAL
Qty: 30 TABLET | Refills: 0 | Status: SHIPPED | OUTPATIENT
Start: 2024-05-17 | End: 2024-06-16

## 2024-05-16 RX ORDER — REGADENOSON 0.08 MG/ML
0.4 INJECTION, SOLUTION INTRAVENOUS
Status: COMPLETED | OUTPATIENT
Start: 2024-05-16 | End: 2024-05-16

## 2024-05-16 RX ORDER — METOPROLOL SUCCINATE 50 MG/1
100 TABLET, EXTENDED RELEASE ORAL
Status: DISCONTINUED | OUTPATIENT
Start: 2024-05-16 | End: 2024-05-16 | Stop reason: HOSPADM

## 2024-05-16 RX ORDER — ROSUVASTATIN CALCIUM 10 MG/1
10 TABLET, COATED ORAL NIGHTLY
Qty: 30 TABLET | Refills: 0 | Status: SHIPPED | OUTPATIENT
Start: 2024-05-16 | End: 2024-06-15

## 2024-05-16 RX ORDER — HEPARIN SODIUM 10000 [USP'U]/100ML
11 INJECTION, SOLUTION INTRAVENOUS
Status: DISCONTINUED | OUTPATIENT
Start: 2024-05-16 | End: 2024-05-16

## 2024-05-16 RX ADMIN — FLUTICASONE PROPIONATE 2 SPRAY: 50 SPRAY, METERED NASAL at 11:32

## 2024-05-16 RX ADMIN — FUROSEMIDE 40 MG: 10 INJECTION, SOLUTION INTRAMUSCULAR; INTRAVENOUS at 11:32

## 2024-05-16 RX ADMIN — METOPROLOL TARTRATE 100 MG: 100 TABLET, FILM COATED ORAL at 11:32

## 2024-05-16 RX ADMIN — PREGABALIN 75 MG: 75 CAPSULE ORAL at 11:33

## 2024-05-16 RX ADMIN — EMPAGLIFLOZIN 10 MG: 10 TABLET, FILM COATED ORAL at 18:42

## 2024-05-16 RX ADMIN — CLOPIDOGREL BISULFATE 75 MG: 75 TABLET, FILM COATED ORAL at 11:32

## 2024-05-16 RX ADMIN — BUPROPION HYDROCHLORIDE 150 MG: 150 TABLET, EXTENDED RELEASE ORAL at 06:05

## 2024-05-16 RX ADMIN — ASPIRIN 81 MG: 81 TABLET, COATED ORAL at 11:32

## 2024-05-16 RX ADMIN — APIXABAN 5 MG: 5 TABLET, FILM COATED ORAL at 17:14

## 2024-05-16 RX ADMIN — Medication 10 ML: at 11:33

## 2024-05-16 RX ADMIN — PANTOPRAZOLE SODIUM 40 MG: 40 TABLET, DELAYED RELEASE ORAL at 06:05

## 2024-05-16 RX ADMIN — REGADENOSON 0.4 MG: 0.08 INJECTION, SOLUTION INTRAVENOUS at 08:55

## 2024-05-16 RX ADMIN — METOPROLOL SUCCINATE 100 MG: 50 TABLET, EXTENDED RELEASE ORAL at 17:14

## 2024-05-16 RX ADMIN — TECHNETIUM TC 99M SESTAMIBI 1 DOSE: 1 INJECTION INTRAVENOUS at 07:32

## 2024-05-16 RX ADMIN — TECHNETIUM TC 99M SESTAMIBI 1 DOSE: 1 INJECTION INTRAVENOUS at 08:55

## 2024-05-16 RX ADMIN — HEPARIN SODIUM 11 UNITS/KG/HR: 10000 INJECTION, SOLUTION INTRAVENOUS at 11:31
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
## 2024-05-16 NOTE — PHARMACY PATIENT ASSISTANCE
Pharmacy consulted to check cost and coverage of medication. Per patient's insurance, a 30-day supply of the following medication will have the respective co-pay for a 30-day supply:    Eliquis - $11.20  Xarelto - $11.20  Entresto - $11.20  Verquvo - $11.20  Jardiance - $11.20  Farxiga - $11.20    No affordability issues or concerns identified at this time.       Thank you,     [facility]  PGY-1 Community Pharmacy Resident  05/16/24  14:42 EDT

## 2024-05-16 NOTE — PLAN OF CARE
Goal Outcome Evaluation:  Plan of Care Reviewed With: patient        Progress: improving            Pt resting in bed at this time, pt is A/O X4 and on RA. No complaints or distress noted. VSS afebrile. Plan of care ongoing.              Phase 1 - Admission/Acute - Current (Heart Failure Pathway)  Output is greater than intake.  Outcome: Met  Patient's oxygen saturation maintained above 90% unless otherwise specified.  Outcome: Met  Patient reports improvement in symptoms since arrival.  Outcome: Met  An increase-progression in activity with patient's baseline in mind. Patient performing daily AMPAC-6 goal.  Outcome: Met  Initiate Guideline Directed Medical Therapy (ex.ACE, ARBs, ARNI, Beta Blockers, SGLT2 Inhibitors).  Outcome: Met

## 2024-05-16 NOTE — PAYOR COMM NOTE
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    sodium chloride 0.9 % flush 10 mL  10 mL Intravenous PRN Edwin Stovall DO        sodium chloride 0.9 % infusion 40 mL  40 mL Intravenous PRN Edwin Stovall DO         Orders (all)        Start     Ordered    05/16/24 1000  Heparin Anti-Xa  Timed         05/16/24 0340    05/16/24 0900  clopidogrel (PLAVIX) tablet 75 mg  Daily         05/15/24 2007    05/16/24 0900  fluticasone (FLONASE) 50 MCG/ACT nasal spray 2 spray  Daily         05/15/24 2007    05/16/24 0900  aspirin EC tablet 81 mg  Daily         05/15/24 2007    05/16/24 0700  buPROPion XL (WELLBUTRIN XL) 24 hr tablet 150 mg  Every Morning         05/15/24 2007    05/16/24 0600  pantoprazole (PROTONIX) EC tablet 40 mg  Every Early Morning         05/15/24 2007    05/16/24 0600  CBC (No Diff)  Morning Draw         05/15/24 2008    05/16/24 0600  Basic Metabolic Panel  Morning Draw         05/15/24 2008    05/16/24 0327  STAT Lactic Acid, Reflex  PROCEDURE ONCE         05/15/24 2149 05/16/24 0300  Heparin Anti-Xa  Timed         05/15/24 2114    05/16/24 0002  NPO Diet NPO Type: Sips with Meds  Diet Effective Now         05/16/24 0001     05/16/24 0001  Stress Test With Myocardial Perfusion One Day  Once         05/16/24 0001    05/15/24 2122  STAT Lactic Acid, Reflex  PROCEDURE ONCE         05/15/24 1902    05/15/24 2100  sodium chloride 0.9 % flush 10 mL  Every 12 Hours Scheduled         05/15/24 1425    05/15/24 2100  furosemide (LASIX) injection 40 mg  2 Times Daily         05/15/24 1425    05/15/24 2100  metoprolol tartrate (LOPRESSOR) tablet 100 mg  Every 12 Hours Scheduled         05/15/24 2007    05/15/24 2100  pregabalin (LYRICA) capsule 75 mg  2 Times Daily         05/15/24 2007    05/15/24 2100  rOPINIRole (REQUIP) tablet 1 mg  Nightly         05/15/24 2007    05/15/24 2100  rosuvastatin (CRESTOR) tablet 10 mg  Nightly         05/15/24 2007    05/15/24 2100  heparin 17911 units/250 mL (100 units/mL) in 0.45 % NaCl infusion  Titrated         05/15/24 2007    05/15/24 2008  Initiate & Follow Heparin Protocol  Continuous         05/15/24 2007    05/15/24 2008  Notify Provider Platelet Count Less Than 00461  Continuous        Comments: Open Order Report to View Parameters Requiring Provider Notification    05/15/24 2007    05/15/24 2008  Stop Infusion & Notify Provider if Bleeding Occurs  Continuous        Comments: Open Order Report to View Parameters Requiring Provider Notification    05/15/24 2007    05/15/24 2008  Heparin Anti-Xa  STAT        Comments: Prior to Initial Heparin Bolus      05/15/24 2007    05/15/24 2008  Protime-INR  STAT        Comments: Prior to Initial Heparin Bolus      05/15/24 2007    05/15/24 2008  aPTT  STAT        Comments: Prior to Initial Heparin Bolus      05/15/24 2007    05/15/24 2007  heparin (porcine) 5000 UNIT/ML injection 3,700 Units  As Needed,   Status:  Discontinued         05/15/24 2007    05/15/24 2007  heparin (porcine) 5000 UNIT/ML injection 1,900 Units  As Needed,   Status:  Discontinued         05/15/24 2007    05/15/24 2007  Pharmacy to Dose Heparin  Continuous PRN         05/15/24 2007     05/15/24 2006  albuterol (PROVENTIL) nebulizer solution 0.083% 2.5 mg/3mL  Every 4 Hours PRN         05/15/24 2007    05/15/24 2006  oxyCODONE-acetaminophen (PERCOCET)  MG per tablet 1 tablet  Every 6 Hours PRN         05/15/24 2007    05/15/24 1807  STAT Lactic Acid, Reflex  PROCEDURE ONCE         05/15/24 1624    05/15/24 1800  Oral Care  2 Times Daily       05/15/24 1425    05/15/24 1600  Strict Intake & Output  Every 4 Hours       05/15/24 1425    05/15/24 1600  Vital Signs  Every 4 Hours       05/15/24 1425    05/15/24 1453  STAT Lactic Acid, Reflex  PROCEDURE ONCE         05/15/24 1228    05/15/24 1448  Inpatient Case Management  Consult  Once        Provider:  (Not yet assigned)    05/15/24 1448    05/15/24 1448  Inpatient Nutrition Consult  Once        Provider:  (Not yet assigned)    05/15/24 1448    05/15/24 1426  Daily Weights  Daily       05/15/24 1425    05/15/24 1426  Vital Signs  Per Hospital Policy         05/15/24 1425    05/15/24 1426  Continuous Cardiac Monitoring  Continuous        Comments: Follow Standing Orders As Outlined in Process Instructions (Open Order Report to View Full Instructions)    05/15/24 1425    05/15/24 1426  Maintain IV Access  Continuous,   Status:  Canceled         05/15/24 1425    05/15/24 1426  Telemetry - Place Orders & Notify Provider of Results When Patient Experiences Acute Chest Pain, Dysrhythmia or Respiratory Distress  Continuous        Comments: Open Order Report to View Parameters Requiring Provider Notification    05/15/24 1425    05/15/24 1426  May Be Off Telemetry for Tests  Continuous         05/15/24 1425    05/15/24 1426  ReDs Vest  Once         05/15/24 1425    05/15/24 1426  Inpatient Heart Failure Navigator Consult  Once        Provider:  (Not yet assigned)    05/15/24 1425    05/15/24 1426  Intake & Output  Every Shift       05/15/24 1425    05/15/24 1426  Weigh Patient  Once         05/15/24 1425    05/15/24 1426  Insert Peripheral  "IV  Once         05/15/24 1425    05/15/24 1426  Saline Lock & Maintain IV Access  Continuous         05/15/24 1425    05/15/24 1426  Initiate Observation Status  Once         05/15/24 1425    05/15/24 1426  Code Status and Medical Interventions:  Continuous         05/15/24 1425    05/15/24 1426  Diet: Cardiac; Healthy Heart (2-3 Na+); Fluid Consistency: Thin (IDDSI 0)  Diet Effective Now,   Status:  Canceled         05/15/24 1425    05/15/24 1426  Continuous Pulse Oximetry  Continuous         05/15/24 1425    05/15/24 1426  Adult Transthoracic Echo Complete With Contrast if Necessary Per Protocol  Once         05/15/24 1425    05/15/24 1426  VTE Prophylaxis Not Indicated: Contraindicated; Other; On eliquis  Once         05/15/24 1425    05/15/24 1426  Diet: Cardiac; Healthy Heart (2-3 Na+); Fluid Consistency: Thin (IDDSI 0)  Diet Effective Now,   Status:  Canceled         05/15/24 1425    05/15/24 1425  nitroglycerin (NITROSTAT) SL tablet 0.4 mg  Every 5 Minutes PRN         05/15/24 1425    05/15/24 1425  sodium chloride 0.9 % flush 10 mL  As Needed         05/15/24 1425    05/15/24 1425  sodium chloride 0.9 % infusion 40 mL  As Needed         05/15/24 1425    05/15/24 1425  sennosides-docusate (PERICOLACE) 8.6-50 MG per tablet 2 tablet  2 Times Daily PRN        Placed in \"And\" Linked Group    05/15/24 1425    05/15/24 1425  polyethylene glycol (MIRALAX) packet 17 g  Daily PRN        Placed in \"And\" Linked Group    05/15/24 1425    05/15/24 1425  bisacodyl (DULCOLAX) EC tablet 5 mg  Daily PRN        Placed in \"And\" Linked Group    05/15/24 1425    05/15/24 1425  bisacodyl (DULCOLAX) suppository 10 mg  Daily PRN        Placed in \"And\" Linked Group    05/15/24 1425    05/15/24 1353  Consult for CHF Education  Once        Provider:  (Not yet assigned)    05/15/24 1352    05/15/24 1344  High Sensitivity Troponin T 2Hr  PROCEDURE ONCE         05/15/24 1225    05/15/24 1336  Initiate Observation Status  Once         " 05/15/24 1344    05/15/24 1336  Code Status and Medical Interventions:  Continuous,   Status:  Canceled         05/15/24 1344    05/15/24 1243  oxyCODONE-acetaminophen (PERCOCET)  MG per tablet 1 tablet  Once         05/15/24 1227    05/15/24 1223  furosemide (LASIX) injection 80 mg  Once         05/15/24 1207    05/15/24 1221  Urinalysis, Microscopic Only - Urine, Clean Catch  Once         05/15/24 1220    05/15/24 1151  Lactic Acid, Plasma  Once         05/15/24 1150    05/15/24 1151  [place] Draw  Once         05/15/24 1150    05/15/24 1151  Blood Culture - Blood, Arm, Left  Once         05/15/24 1150    05/15/24 1151  Blood Culture - Blood, Hand, Left  Once         05/15/24 1150    05/15/24 1151  Green Top (Gel)  PROCEDURE ONCE         05/15/24 1150    05/15/24 1151  Lavender Top  PROCEDURE ONCE,   Status:  Canceled         05/15/24 1150    05/15/24 1151  Gold Top - SST  PROCEDURE ONCE         05/15/24 1150    05/15/24 1151  Light Blue Top  PROCEDURE ONCE         05/15/24 1150    05/15/24 1132  Blood Gas, Arterial With Co-Ox  PROCEDURE ONCE         05/15/24 1131    05/15/24 1121  [place] Draw  Once         05/15/24 1120    05/15/24 1121  Blood Gas, Arterial -With Co-Ox Panel: Yes  Once         05/15/24 1120    05/15/24 1121  BNP  Once         05/15/24 1120    05/15/24 1121  High Sensitivity Troponin T  Once         05/15/24 1120    05/15/24 1121  ECG 12 Lead Dyspnea  Once         05/15/24 1120    05/15/24 1121  XR Chest 1 View  1 Time Imaging         05/15/24 1120    05/15/24 1121  Green Top (Gel)  PROCEDURE ONCE         05/15/24 1120    05/15/24 1121  Lavender Top  PROCEDURE ONCE         05/15/24 1120    05/15/24 1121  Gold Top - SST  PROCEDURE ONCE         05/15/24 1120    05/15/24 1121  Light Blue Top  PROCEDURE ONCE,   Status:  Canceled         05/15/24 1120    05/15/24 1120  COVID-19 and FLU A/B PCR, 1 HR TAT - Swab, Nasopharynx  Once         05/15/24 1120    05/15/24 1120  CBC & Differential  Once          05/15/24 1120    05/15/24 1120  Comprehensive Metabolic Panel  Once         05/15/24 1120    05/15/24 1120  Urinalysis With Microscopic If Indicated (No Culture) - Urine, Clean Catch  Once         05/15/24 1120    05/15/24 1120  CBC Auto Differential  PROCEDURE ONCE         05/15/24 1120    05/15/24 0000  Telemetry Scan  Once         05/15/24 0000    05/15/24 0000  Telemetry Scan  Once         05/15/24 0000    Unscheduled  Up in Chair  As Needed       05/15/24 1425    --  aspirin 81 MG EC tablet  Daily         05/15/24 1502    --  rosuvastatin (CRESTOR) 10 MG tablet  Nightly         05/15/24 1505    --  meclizine 25 MG chewable tablet chewable tablet  Every 6 Hours PRN         05/15/24 1505    --  apixaban (ELIQUIS) 5 MG tablet tablet  2 Times Daily         05/15/24 1506                  Physician Progress Notes (all)    No notes of this type exist for this encounter.       Consult Notes (all)    No notes of this type exist for this encounter.

## 2024-05-16 NOTE — PROGRESS NOTES
HEPARIN INFUSION  Eliud Crouch is a  69 y.o. male receiving heparin infusion.     Therapy for (VTE/Cardiac):   Cardiac  Patient Dosing Weight: 73.4 kg  Initial Bolus (Y/N):   N  Any Bolus (Y/N):   Y        Signs or Symptoms of Bleeding: No    Cardiac or Other (Not VTE)   Initial Bolus: 60 units/kg (Max 4,000 units)  Initial rate: 12 units/kg/hr (Max 1,000 units/hr)   Anti Xa Rebolus Infusion Hold time Change infusion Dose (Units/kg/hr) Next Anti Xa or aPTT Level Due   < 0.11 50 Units/kg  (4000 Units Max) None Increase by  3 Units/kg/hr 6 hours   0.11- 0.19 25 Units/kg  (2000 Units Max) None Increase by  2 Units/kg/hr 6 hours   0.2 - 0.29 0 None Increase by  1 Units/kg/hr 6 hours   0.3 - 0.5 0 None No Change 6 hours (after 2 consecutive levels in range check q24h @0700)   0.51 - 0.6 0 None Decrease by  1 Units/kg/hr 6 hours   0.61 - 0.8 0 30 Minutes Decrease by  2 Units/kg/hr 6 hours   0.81 - 1 0 60 Minutes Decrease by  3 Units/kg/hr 6 hours   >1 0 Hold  After Anti Xa less than 0.5 decrease previous rate by  4 Units/kg/hr  Every 2 hours until Anti Xa  less than 0.5 then when infusion restarts in 6 hours         Recommend anti-Xa every 6 hours.          Date   Time   Anti-Xa Current Rate (Unit/kg/hr) Bolus   (Units) Rate Change   (Unit/kg/hr) New Rate (Unit/kg/hr) Next   Anti-Xa Comments  Pump Check Daily   5/15 2110 0.42 0 0 +10 10 0300 Confirmed starting rate, weight, and no bolus along with no s/s bleeding with Denise PETERSEN. Patient was previously on Eliquis but this is NOT treatment failure.      Pharmacy will continue to follow anti-Xa results and monitor for signs and symptoms of bleeding or thrombosis.    Andres Soto RPH  05/15/24 20:32 EDT

## 2024-05-16 NOTE — PROGRESS NOTES
[facility] HOSPITALISTS DISCHARGE SUMMARY    Patient Identification:  Name:  Eliud Crouch  Age:  69 y.o.  Sex:  male  :  1955  MRN:  [id number]  Visit Number:  [id number]    Date of Admission: 5/15/2024  Date of Discharge:  2024     PCP: Jimmie Alonso MD    DISCHARGE DIAGNOSIS  Acute on chronic HFrEF  Permanent atrial fibrillation  NSTEMI secondary to acute HFrEF  Hx CAD s/p stenting  Hypertension  Hyperlipidemia  Chronic tobacco abuse  GERD    CONSULTS   Cardiology    PROCEDURES PERFORMED      HOSPITAL COURSE  Patient is a 69 y.o. male presented to [facility] complaining of shortness of breath.  Please see the admitting history and physical for further details.      Patient presenting to the hospital with above complaint which has been ongoing over the past 5 months.  Patient seen and evaluated in the emergency department and hospital service contacted for admission for acute exacerbation of heart failure after initial evaluation.  Review of prior records and outpatient documentation shows patient's with poor compliance with symptoms ongoing for quite some time as noted above with patient more recently having increasing orthopnea and dyspnea with minimal exertion.  In discussion with patient he admits to infrequently taking his prescribed guideline directed medical therapy for known HFrEF.  Patient educated on the importance of being compliant with his medications as all of his symptoms being secondary to his noncompliance due to inappropriate treatment of heart failure.  Patient was treated with IV diuretics and cardiology consulted as patient's medical noncompliance did raise increased risk and concerns for possible ongoing chronic ischemia contributing to his symptoms.  Cardiology did see and evaluate the patient closely while in hospital and recommended continued diuresis and then once euvolemic to continue with guideline directed medical therapy and  encouraging compliance.  Stress test obtained showed large infarction with no significant ischemia and cardiology did not recommend further evaluation with more aggressive measures such as left heart cath.  TTE obtained however did show an EF of 21 to 25% with mildly dilated left ventricular cavity and mild septal asymmetric hypertrophy with grade 3 fixed restrictive pattern with moderately reduced right ventricular systolic function noted as well with severe dilation of the left and right atrial cavities and moderate to severe mitral valve regurg with an estimated RVSP of 45 to 55 mmHg.  As such patient was recommended for LifeVest.  Initial plan had been to keep patient in the hospital for 1 further day for continue diuretics and delivery of LifeVest here in hospital however patient expressed concern for not wanting to miss primary care appointment the following day where he receives all of his medications through an 8 program and as he was on room air and [employer] company had been contacted for delivery and fitment at home and he was on appropriate medications with improvement in his overall functional status he was felt to have achieved maximal benefit of continued inpatient hospitalization and safe for discharge home with continued follow-up with cardiology where he was stressed on the importance of maintaining medication compliance as well as follow-up.  Patient will follow-up with his primary care and cardiologist postdischarge.    VITAL SIGNS:  Temp:  [97.7 °F (36.5 °C)-98.7 °F (37.1 °C)] 98.7 °F (37.1 °C)  Heart Rate:  [60-85] 69  Resp:  [16-20] 20  BP: (101-136)/(66-95) 115/73  SpO2:  [90 %-99 %] 96 %  on   ;   Device (Oxygen Therapy): room air    Body mass index is 24.89 kg/m².  Wt Readings from Last 3 Encounters:   05/16/24 74.3 kg (163 lb 11.2 oz)   05/09/24 76.3 kg (168 lb 4 oz)   05/09/24 74.8 kg (165 lb)       PHYSICAL EXAM:  Constitutional:  Well-developed and well-nourished.  No respiratory  distress.      HENT:  Head:  Normocephalic and atraumatic.  Mouth:  Moist mucous membranes.    Eyes:  Conjunctivae and EOM are normal.  No scleral icterus.    Neck:  Neck supple.  No JVD present.    Cardiovascular: Irregularly irregular rate and rhythm but not tachycardic with normal heart sounds with no murmur.  Pulmonary/Chest:  No respiratory distress, no wheezes, no crackles, with  decreased breath sounds and air movement at the bases  Abdominal:  Soft.  Bowel sounds are normal.  No distension and no tenderness.   Musculoskeletal:  No tenderness, and no deformity.  No red or swollen joints anywhere.    Neurological:  Alert and oriented to person, place, and time.  No cranial nerve deficit.  No tongue deviation.  No facial droop.  No slurred speech.   Skin:  Skin is warm and dry. No rash noted. No pallor.   Peripheral vascular: Pulses in all 4 extremities with no clubbing, no cyanosis, 1+ pitting edema of the lower extremities but improved from admission.    DISCHARGE DISPOSITION   Stable    DISCHARGE MEDICATIONS:     Discharge Medications        New Medications        Instructions Start Date   empagliflozin 10 MG tablet tablet  Commonly known as: JARDIANCE   10 mg, Oral, Daily      metoprolol succinate  MG 24 hr tablet  Commonly known as: TOPROL-XL   100 mg, Oral, Every 24 Hours Scheduled   Start Date: May 17, 2024     sacubitril-valsartan 24-26 MG tablet  Commonly known as: ENTRESTO   1 tablet, Oral, Every 12 Hours Scheduled             Changes to Medications        Instructions Start Date   clopidogrel 75 MG tablet  Commonly known as: PLAVIX  What changed: See the new instructions.   75 mg, Oral, Daily      furosemide 20 MG tablet  Commonly known as: LASIX  What changed:   medication strength  how much to take  when to take this   60 mg, Oral, Daily   Start Date: May 17, 2024            Continue These Medications        Instructions Start Date   albuterol sulfate  (90 Base) MCG/ACT  inhaler  Commonly known as: PROVENTIL HFA;VENTOLIN HFA;PROAIR HFA   2 puffs, Inhalation, Every 4 Hours PRN      apixaban 5 MG tablet tablet  Commonly known as: ELIQUIS   5 mg, Oral, 2 Times Daily      buPROPion  MG 24 hr tablet  Commonly known as: WELLBUTRIN XL   150 mg, Oral, Every Morning      ezetimibe 10 MG tablet  Commonly known as: ZETIA   10 mg, Oral, Daily      fluticasone 50 MCG/ACT nasal spray  Commonly known as: FLONASE   2 sprays, Nasal, Daily      meclizine 25 MG chewable tablet chewable tablet   25 mg, Oral, Every 6 Hours PRN      oxyCODONE-acetaminophen  MG per tablet  Commonly known as: PERCOCET   1 tablet, Oral, Every 6 Hours PRN      pantoprazole 40 MG EC tablet  Commonly known as: PROTONIX   40 mg, Oral, Daily      potassium chloride ER 20 MEQ tablet controlled-release ER tablet  Commonly known as: K-TAB   20 mEq, Oral, Daily      pregabalin 75 MG capsule  Commonly known as: LYRICA   75 mg, Oral, 2 Times Daily      rOPINIRole 1 MG tablet  Commonly known as: REQUIP   1 mg, Oral, Nightly      rosuvastatin 10 MG tablet  Commonly known as: CRESTOR   10 mg, Oral, Nightly             Stop These Medications      aspirin 81 MG EC tablet     metoprolol tartrate 100 MG tablet  Commonly known as: LOPRESSOR                Additional Instructions for the Follow-ups that You Need to Schedule       Discharge Follow-up with PCP   As directed       Currently Documented PCP:    Jimmie Alonso MD    PCP Phone Number:    [phone]     Follow Up Details: 1 week post hospital follow up        Discharge Follow-up with Specialty: Cardiology; 2 Weeks   As directed      Specialty: Cardiology   Follow Up: 2 Weeks   Follow Up Details: 1-2 week follow up with Cardiology               Follow-up Information       [facility] .    Specialty: Cardiology  Contact information:  [address] [place] [id number] [phone]             Jimmie Alonso MD .     Specialty: Family Medicine  Why: 1 week post hospital follow up  Contact information:  [place] [address] [phone]                              TEST  RESULTS PENDING AT DISCHARGE  Pending Labs       Order Current Status    Blood Culture - Blood, Arm, Left Preliminary result    Blood Culture - Blood, Hand, Left Preliminary result             CODE STATUS  Code Status and Medical Interventions:   Ordered at: 05/15/24 1425     Code Status (Patient has no pulse and is not breathing):    CPR (Attempt to Resuscitate)     Medical Interventions (Patient has pulse or is breathing):    Full Support       Edwin Stovall DO  [facility] Hospitalist  05/16/24  17:56 EDT    Please note that this discharge summary required more than 30 minutes to complete.

## 2024-05-16 NOTE — PROGRESS NOTES
HEPARIN INFUSION  Eliud Crouch is a  69 y.o. male receiving heparin infusion.     Therapy for (VTE/Cardiac):   Cardiac  Patient Dosing Weight: 73.4 kg  Initial Bolus (Y/N):   N  Any Bolus (Y/N):   Y        Signs or Symptoms of Bleeding: No    Cardiac or Other (Not VTE)   Initial Bolus: 60 units/kg (Max 4,000 units)  Initial rate: 12 units/kg/hr (Max 1,000 units/hr)   Anti Xa Rebolus Infusion Hold time Change infusion Dose (Units/kg/hr) Next Anti Xa or aPTT Level Due   < 0.11 50 Units/kg  (4000 Units Max) None Increase by  3 Units/kg/hr 6 hours   0.11- 0.19 25 Units/kg  (2000 Units Max) None Increase by  2 Units/kg/hr 6 hours   0.2 - 0.29 0 None Increase by  1 Units/kg/hr 6 hours   0.3 - 0.5 0 None No Change 6 hours (after 2 consecutive levels in range check q24h @0700)   0.51 - 0.6 0 None Decrease by  1 Units/kg/hr 6 hours   0.61 - 0.8 0 30 Minutes Decrease by  2 Units/kg/hr 6 hours   0.81 - 1 0 60 Minutes Decrease by  3 Units/kg/hr 6 hours   >1 0 Hold  After Anti Xa less than 0.5 decrease previous rate by  4 Units/kg/hr  Every 2 hours until Anti Xa  less than 0.5 then when infusion restarts in 6 hours         Recommend anti-Xa every 6 hours.          Date   Time   Anti-Xa Current Rate (Unit/kg/hr) Bolus   (Units) Rate Change   (Unit/kg/hr) New Rate (Unit/kg/hr) Next   Anti-Xa Comments  Pump Check Daily   5/15 2110 0.42 0 0 +10 10 0300 Confirmed starting rate, weight, and no bolus along with no s/s bleeding with Denise PETERSEN. NOT treatment failure.    05/16 0345 0.33 10 - - 10 1000 Tx x1, no changes, no s/s bleeding per TRINITY Walker    05/16 1103 0.22 10 - +1 11 1800 Discussed with Andria PETERSEN. No S/s of bleeding                                                                                                                                                                                                           Pharmacy will continue to follow anti-Xa results and monitor for signs and symptoms of bleeding or  thrombosis.    Cydney Parker, PharmD

## 2024-05-16 NOTE — PLAN OF CARE
Goal Outcome Evaluation:  Plan of Care Reviewed With: patient          Pt is being discharged at this time. Discharge instructions explained. Verbalized understanding.

## 2024-05-17 NOTE — OUTREACH NOTE
Prep Survey      Flowsheet Row Responses   [facility] facility patient discharged from? [facility]   Is LACE score < 7 ? Yes   Eligibility Readm Mgmt   Discharge diagnosis A/C CHF   Does the patient have one of the following disease processes/diagnoses(primary or secondary)? CHF   Does the patient have Home health ordered? No   Is there a DME ordered? No   Prep survey completed? Yes            Amie GIBBS - Registered Nurse

## 2024-05-17 NOTE — DISCHARGE INSTR - APPOINTMENTS
Pt  has  an  apointment  with peg dillon  for  may 23  at  11 ;15  and  heart  failure  for may 28  at  3 pm  and  al  with  dr klein  for  may n31 a t  10 :45   patient  aware  of  apointmnets    via  telephone  call  by  writer

## 2024-05-20 ENCOUNTER — READMISSION MANAGEMENT (OUTPATIENT)
Dept: CALL CENTER | Facility: HOSPITAL | Age: 69
End: 2024-05-20
Payer: MEDICARE

## 2024-05-20 LAB
BACTERIA SPEC AEROBE CULT: NORMAL
BACTERIA SPEC AEROBE CULT: NORMAL

## 2024-05-20 NOTE — DISCHARGE SUMMARY
[facility] DISCHARGE SUMMARY    Patient Identification:  Name:  Eliud Crouch  Age:  69 y.o.  Sex:  male  :  1955  MRN:  [id number]  Visit Number:  [id number]    Date of Admission: 5/15/2024  Date of Discharge:  2024    PCP: Jimmie Alonso MD    DISCHARGE DIAGNOSIS  Acute on chronic HFrEF  Permanent atrial fibrillation  NSTEMI secondary to acute HFrEF  Hx CAD s/p stenting  Hypertension  Hyperlipidemia  Chronic tobacco abuse  GERD    CONSULTS   Cardiology    PROCEDURES PERFORMED      HOSPITAL COURSE  Patient is a 69 y.o. male presented to [facility] complaining of shortness of breath.  Please see the admitting history and physical for further details.       Patient presenting to the hospital with above complaint which has been ongoing over the past 5 months.  Patient seen and evaluated in the emergency department and hospital service contacted for admission for acute exacerbation of heart failure after initial evaluation.  Review of prior records and outpatient documentation shows patient's with poor compliance with symptoms ongoing for quite some time as noted above with patient more recently having increasing orthopnea and dyspnea with minimal exertion.  In discussion with patient he admits to infrequently taking his prescribed guideline directed medical therapy for known HFrEF.  Patient educated on the importance of being compliant with his medications as all of his symptoms being secondary to his noncompliance due to inappropriate treatment of heart failure.  Patient was treated with IV diuretics and cardiology consulted as patient's medical noncompliance did raise increased risk and concerns for possible ongoing chronic ischemia contributing to his symptoms.  Cardiology did see and evaluate the patient closely while in hospital and recommended continued diuresis and then once euvolemic to continue with guideline directed medical therapy and  encouraging compliance.  Stress test obtained showed large infarction with no significant ischemia and cardiology did not recommend further evaluation with more aggressive measures such as left heart cath.  TTE obtained however did show an EF of 21 to 25% with mildly dilated left ventricular cavity and mild septal asymmetric hypertrophy with grade 3 fixed restrictive pattern with moderately reduced right ventricular systolic function noted as well with severe dilation of the left and right atrial cavities and moderate to severe mitral valve regurg with an estimated RVSP of 45 to 55 mmHg.  As such patient was recommended for LifeVest.  Initial plan had been to keep patient in the hospital for 1 further day for continue diuretics and delivery of LifeVest here in hospital however patient expressed concern for not wanting to miss primary care appointment the following day where he receives all of his medications through an 8 program and as he was on room air and [employer] had been contacted for delivery and fitment at home and he was on appropriate medications with improvement in his overall functional status he was felt to have achieved maximal benefit of continued inpatient hospitalization and safe for discharge home with continued follow-up with cardiology where he was stressed on the importance of maintaining medication compliance as well as follow-up.  Patient will follow-up with his primary care and cardiologist postdischarge.      VITAL SIGNS:        on   ;        Body mass index is 24.89 kg/m².  Wt Readings from Last 3 Encounters:   05/16/24 74.3 kg (163 lb 11.2 oz)   05/09/24 76.3 kg (168 lb 4 oz)   05/09/24 74.8 kg (165 lb)       PHYSICAL EXAM:  Constitutional:  Well-developed and well-nourished.  No respiratory distress.      HENT:  Head:  Normocephalic and atraumatic.  Mouth:  Moist mucous membranes.    Eyes:  Conjunctivae and EOM are normal.  No scleral icterus.    Neck:  Neck supple.  No JVD  present.    Cardiovascular: Irregularly irregular rate and rhythm but not tachycardic with normal heart sounds with no murmur.  Pulmonary/Chest:  No respiratory distress, no wheezes, no crackles, with  decreased breath sounds and air movement at the bases  Abdominal:  Soft.  Bowel sounds are normal.  No distension and no tenderness.   Musculoskeletal:  No tenderness, and no deformity.  No red or swollen joints anywhere.    Neurological:  Alert and oriented to person, place, and time.  No cranial nerve deficit.  No tongue deviation.  No facial droop.  No slurred speech.   Skin:  Skin is warm and dry. No rash noted. No pallor.   Peripheral vascular: Pulses in all 4 extremities with no clubbing, no cyanosis, 1+ pitting edema of the lower extremities but improved from admission.    DISCHARGE DISPOSITION   Stable    DISCHARGE MEDICATIONS:     Discharge Medications        New Medications        Instructions Start Date   Entresto 24-26 MG tablet  Generic drug: sacubitril-valsartan   Take 1 tablet by mouth Every 12 (Twelve) Hours.      Jardiance 10 MG tablet tablet  Generic drug: empagliflozin   Take 1 tablet by mouth Daily.      metoprolol succinate  MG 24 hr tablet  Commonly known as: TOPROL-XL   Take 1 tablet by mouth Daily.             Changes to Medications        Instructions Start Date   clopidogrel 75 MG tablet  Commonly known as: PLAVIX  What changed: See the new instructions.   Take 1 tablet by mouth Daily.      furosemide 20 MG tablet  Commonly known as: LASIX  What changed:   medication strength  how much to take  when to take this   Take 3 tablets by mouth Daily.             Continue These Medications        Instructions Start Date   albuterol sulfate  (90 Base) MCG/ACT inhaler  Commonly known as: PROVENTIL HFA;VENTOLIN HFA;PROAIR HFA   2 puffs, Inhalation, Every 4 Hours PRN      buPROPion  MG 24 hr tablet  Commonly known as: WELLBUTRIN XL   150 mg, Oral, Every Morning      Eliquis 5 MG  tablet tablet  Generic drug: apixaban   Take 1 tablet by mouth 2 (Two) Times a Day.      ezetimibe 10 MG tablet  Commonly known as: ZETIA   10 mg, Oral, Daily      fluticasone 50 MCG/ACT nasal spray  Commonly known as: FLONASE   2 sprays, Nasal, Daily      meclizine 25 MG chewable tablet chewable tablet   25 mg, Oral, Every 6 Hours PRN      oxyCODONE-acetaminophen  MG per tablet  Commonly known as: PERCOCET   1 tablet, Oral, Every 6 Hours PRN      pantoprazole 40 MG EC tablet  Commonly known as: PROTONIX   40 mg, Oral, Daily      potassium chloride ER 20 MEQ tablet controlled-release ER tablet  Commonly known as: K-TAB   20 mEq, Oral, Daily      pregabalin 75 MG capsule  Commonly known as: LYRICA   75 mg, Oral, 2 Times Daily      rOPINIRole 1 MG tablet  Commonly known as: REQUIP   1 mg, Oral, Nightly      rosuvastatin 10 MG tablet  Commonly known as: CRESTOR   Take 1 tablet by mouth Every Night.             Stop These Medications      aspirin 81 MG EC tablet     metoprolol tartrate 100 MG tablet  Commonly known as: LOPRESSOR              Diet Instructions    Low Sodium         Activity Instructions    As tolerated         Additional Instructions for the Follow-ups that You Need to Schedule       Discharge Follow-up with PCP   As directed       Currently Documented PCP:    Jimmie Alonso MD    PCP Phone Number:    [phone]     Follow Up Details: 1 week post hospital follow up        Discharge Follow-up with Specialty: Cardiology; 2 Weeks   As directed      Specialty: Cardiology   Follow Up: 2 Weeks   Follow Up Details: 1-2 week follow up with Cardiology               Follow-up Information       [facility] Follow up in 1 week(s).    Specialty: Cardiology  Why: PATIENT WILL BE NOTIFIED ABOUT APPT ON FRI 5/17 AM  Contact information:  [address] [facility] [id number] [phone]             Jimmie Alonso MD Follow up in 1 week(s).    Specialty: Family  Medicine  Why: PATIENT WILL BE NOTIFIED ABOUT APPT ON FRI 5/17 AM  Contact information:  [address]  [place] [address]  [phone]               Jimmie Dillon MD Follow up in 1 week(s).    Specialty: Family Medicine  Why: pt  has  an  apointment  with  peg dillon  for  may 23  at  11 ;15  and  heart  failure  for  may  28  at  3  pm  and  al  deja  for  may 31  at  10 45    pt  aware  of  apointmebnts  via  telephone   call  via  this  writer;  Contact information:  [address] [place] [address]  [phone]                              TEST  RESULTS PENDING AT DISCHARGE  Pending Labs       Order Current Status    Blood Culture - Blood, Arm, Left Preliminary result    Blood Culture - Blood, Hand, Left Preliminary result             CODE STATUS  Code Status and Medical Interventions:   Ordered at: 05/15/24 1425     Code Status (Patient has no pulse and is not breathing):    CPR (Attempt to Resuscitate)     Medical Interventions (Patient has pulse or is breathing):    Full Support       Edwin Stovall DO  [facility]  05/20/24  11:49 EDT    Please note that this discharge summary required more than 30 minutes to complete.
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
## 2024-05-29 ENCOUNTER — READMISSION MANAGEMENT (OUTPATIENT)
Dept: CALL CENTER | Facility: HOSPITAL | Age: 69
End: 2024-05-29
Payer: MEDICARE

## 2024-05-29 NOTE — OUTREACH NOTE
CHF Week 2 Survey      Flowsheet Row Responses   [facility] patient discharged from? [facility]   Does the patient have one of the following disease processes/diagnoses(primary or secondary)? CHF   Week 2 attempt successful? Yes   Call start time 0956   Call end time 1008   Meds reviewed with patient/caregiver? Yes   Is the patient having any side effects they believe may be caused by any medication additions or changes? No   Does the patient have all medications ordered at discharge? Yes   Is the patient taking all medications as directed (includes completed medication regime)? Yes   Comments regarding appointments Cardiology appt 06/18/24.  has seen his PCP already.  missed his heart failure clinic appt-given number to reschedule appt.   Does the patient have a primary care provider?  Yes   Does the patient have an appointment with their PCP within 7 days of discharge? Yes   Has the patient kept scheduled appointments due by today? Yes   Has home health visited the patient within 72 hours of discharge? N/A   Pulse Ox monitoring Intermittent   Pulse Ox device source Patient   O2 Sat comments Sats staying in upper 90s.   O2 Sat: education provided Sat levels   Psychosocial issues? No   Did the patient receive a copy of their discharge instructions? Yes   Nursing interventions Reviewed instructions with patient   What is the patient's perception of their health status since discharge? Same   Nursing interventions Nurse provided patient education   Is the patient able to teach back signs and symptoms of worsening condition? (i.e. weight gain, shortness of air, etc.) Yes   If the patient is a current smoker, are they able to teach back resources for cessation? Smoking cessation medications, [id number]  [ has decreased his smoking, and will quit soon.]   Is the patient/caregiver able to teach back the hierarchy of who to call/visit for symptoms/problems? PCP, Specialist, Home health nurse, Urgent  Care, ED, 911 Yes   Is the patient able to teach back Heart Failure Zones? No   CHF Nursing Interventions Education provided on various zones   CHF Zone this Call Green Zone   Green Zone Patient reports doing well, No new or worsening shortness of breath, Physical activity level is normal for you, No new swelling -  feet, ankles and legs look normal for you, Weight check stable, No chest pain   Green Zone Interventions Daily weight check, Meds as directed, Low sodium diet, Follow up visits planned   CHF Week 2 call completed? Yes   Is the patient interested in additional calls from an ambulatory ? No   Would this patient benefit from a Referral to [facility] Social Work? No   Wrap up additional comments Patient states is improving. States still has some SOA but no worsening. States weakness is improving slowly. Denies any edema or chest pain. Reports weight steady. Denies any needs today.   Call end time 1004            Suzette AUGUST - Registered Nurse

## 2024-06-10 ENCOUNTER — READMISSION MANAGEMENT (OUTPATIENT)
Dept: CALL CENTER | Facility: HOSPITAL | Age: 69
End: 2024-06-10
Payer: MEDICARE

## 2024-06-10 NOTE — OUTREACH NOTE
CHF Week 3 Survey      Flowsheet Row Responses   [facility] facility patient discharged from? [facility]   Does the patient have one of the following disease processes/diagnoses(primary or secondary)? CHF   Week 3 attempt successful? No   Unsuccessful attempts Attempt 1  [Pt's Damion GARRETT request we speak with pt. NA with pt #]   Discharge diagnosis A/C CHF            Cristina BROWN - Registered Nurse

## 2024-06-14 NOTE — PROGRESS NOTES
[facility] Cardiology    Encounter Date: 2024    Patient ID: Eliud Crouch is a 69 y.o. male.  : 1955     PCP: Jimmie Alonso MD       Chief Complaint: Follow-up after recent hospitalization for acute on chronic HFrEF and non-STEMI.    PROBLEM LIST:  Coronary artery disease:  Remote septal MI.  Previous stent to the RCA.  [facility], 2003, Dr. Enrrique Morse: Patent RCA stent, normal LV.  [facility], 10/31/2012: EF 55-60%, mild MR, nonobstructive disease involving multiple vessels.  [facility], 2015: Nonobstructive plaque/disease that involved multiple vessels. EF 35%.  [facility], 2019: Nonobstructive CAD involving multiple vessels without hemodynamically significant disease. EF 55%.  Echocardiogram, 2020: Normal EF. Atrial fibrillation noted.   C, 2023: EF 36%. 100% occlusion of the major obtuse marginal trunk of the circumflex successfully stented with 3.0 x 15 mm BEATRIS and reduced to 0%. 70% stenosis of the upper branch of the obtuse marginal trunk successfully stented with 2.5 x 23 mm BEATRIS and reduced to 0%.  Echo, 2023: EF 45%. Mild MR. Trace TR with normal RVSP. Trivial, probably physiologic pericardial effusion.   Echo, 2024: EF 21-25%. Moderate to severe MR. Moderately elevated RVSP.   MPS, 2024: EF 34%. Abnormal LV wall motion consistent with moderate hypokinesis and global hypokinesis. Basal to mid lateral, inferolateral walls infarction with no significant ischemia and also apical infarction with no significant ischemia.  Atrial fibrillation:  CHADS-VASc score = 3 (HTN, Age 65-74, CAD)  PIPER/ECV to SR, 2015.  PIPER, 2019: EF 55%. Left atrial cavity is moderately dilated. Mild MR/TR. No intracardial thrombus or mass, clear DACIA.  ECV, 2019: Successful cardioversion.  Holter monitor, 2021: Atrial fibrillation with average rate of 71. Occasional PVC's and rare short NSVT. No significant pauses.   Echocardiogram  03/03/2022: LVEF 70%. Normal echo.  NM cardiac stress test 03/03/2022: Normal study.  Dilated cardiomyopathy:  PIPER, 02/22/2015: DCM with EF 40%.  PIPER, 05/20/2019: EF 55%  Echocardiogram 8/16/2024: EF 21-25%.  Moderate to severe MR.  Moderately elevated RVSP.  Hypertension.  Hyperlipidemia.  Carotid plaque:  Carotid duplex, 10/2012: Minimal plaque, normal velocities.  Carotid US, 02/24/2021: No hemodynamically significant, clinically relevant carotid US findings are noted.   Tobacco use:  Smokes 1-2 packs/day  GERD.  Osteoarthritis.  Chronic neck and back pain.    History of Present Illness  Patient presents today for a follow-up with a history of CAD, AF, and cardiac risk factors. Since last visit, the patient was hospitalized at [facility] with respiratory distress in the setting of pneumonia and COPD exacerbation as well as acute on chronic HFrEF.  He also ruled in for non-STEMI and was noted to have low ejection fraction.  On echocardiogram EF was 21 to 25%.  Myocardial perfusion study showed old scars without any evidence of ischemia and ejection fraction was 34% on the nuclear gated SPECT.  Patient was discharged to home with a LifeVest.  His medical therapy was adjusted.  He is here now for follow-up.  Patient states that metoprolol was prescribed but he has not been able to tolerate even half tablet of the 100 mg dose as it drops his blood pressure too low making him feel dizzy.  He has not been taking it.  He has not been on any other GDMT other than Jardiance again due to low blood pressure and dizziness.  He is otherwise feeling better and back to his baseline.  Continues to smoke cigarettes about 1 pack or less a day.  No current chest pain.    Allergies   Allergen Reactions    Penicillins Other (See Comments)     unknown    Morphine Anxiety         Current Outpatient Medications:     albuterol sulfate  (90 Base) MCG/ACT inhaler, Inhale 2 puffs Every 4 (Four) Hours As Needed for Wheezing  "or Shortness of Air., Disp: 18 g, Rfl: 2    buPROPion XL (WELLBUTRIN XL) 150 MG 24 hr tablet, Take 1 tablet by mouth Every Morning., Disp: , Rfl:     Eliquis 5 MG tablet tablet, Take 1 tablet by mouth Every 12 (Twelve) Hours., Disp: , Rfl:     empagliflozin (JARDIANCE) 10 MG tablet tablet, Take 1 tablet by mouth Daily for 180 days., Disp: 30 tablet, Rfl: 5    ezetimibe (ZETIA) 10 MG tablet, Take 1 tablet by mouth Daily., Disp: , Rfl:     fluticasone (FLONASE) 50 MCG/ACT nasal spray, 2 sprays into the nostril(s) as directed by provider Daily., Disp: , Rfl:     furosemide (LASIX) 20 MG tablet, Take 3 tablets by mouth Daily., Disp: 90 tablet, Rfl: 0    meclizine 25 MG chewable tablet chewable tablet, Chew 1 tablet Every 6 (Six) Hours As Needed (dizziness)., Disp: , Rfl:     metoprolol succinate XL (TOPROL-XL) 25 MG 24 hr tablet, Take 1 tablet by mouth Daily for 360 days., Disp: 90 tablet, Rfl: 3    oxyCODONE-acetaminophen (PERCOCET)  MG per tablet, Take 1 tablet by mouth Every 6 (Six) Hours As Needed for Moderate Pain., Disp: , Rfl:     pantoprazole (PROTONIX) 40 MG EC tablet, Take 1 tablet by mouth Daily., Disp: , Rfl:     pregabalin (LYRICA) 75 MG capsule, Take 1 capsule by mouth 2 (Two) Times a Day., Disp: , Rfl:     rOPINIRole (REQUIP) 1 MG tablet, Take 1 tablet by mouth Every Night., Disp: , Rfl:     rosuvastatin (CRESTOR) 10 MG tablet, Take 1 tablet by mouth Every Night., Disp: 30 tablet, Rfl: 0    The following portions of the patient's history were reviewed and updated as appropriate: allergies, current medications, past family history, past medical history, past social history, past surgical history and problem list.    ROS  Review of Systems   14 point ROS negative except for that listed in the HPI.         Objective:     /70 (BP Location: Left arm, Patient Position: Sitting, Cuff Size: Adult)   Pulse 79   Ht 172.7 cm (68\")   Wt 71.9 kg (158 lb 9.6 oz)   SpO2 93%   BMI 24.12 kg/m²  "     Physical Exam  Constitutional: Patient appears well-developed and well-nourished.   HENT: HEENT exam unremarkable.   Neck: Neck supple. No JVD present. No carotid bruits.   Cardiovascular: Normal rate, regular rhythm and normal heart sounds. No murmur heard.   2+ symmetric pulses.   Pulmonary/Chest: Breath sounds normal. Does not exhibit tenderness.   Abdominal: Abdomen benign.   Musculoskeletal: Does not exhibit edema.   Neurological: Neurological exam unremarkable.   Vitals reviewed.    Data Review:   Lab Results   Component Value Date    GLUCOSE 118 (H) 05/16/2024    BUN 23 05/16/2024    CREATININE 1.23 05/16/2024    EGFR 62.8 05/16/2024    BCR 18.7 05/16/2024     05/16/2024    K 4.4 05/16/2024    CO2 27.2 05/16/2024    CALCIUM 8.6 05/16/2024    ALBUMIN 3.8 05/15/2024     (H) 05/15/2024     (H) 05/15/2024     Lab Results   Component Value Date    WBC 9.96 05/16/2024    RBC 4.25 05/16/2024    HGB 11.0 (L) 05/16/2024    HCT 35.8 (L) 05/16/2024    MCV 84.2 05/16/2024     05/16/2024      Procedures       Advance Care Planning   ACP discussion was declined by the patient. Patient does not have an advance directive, declines further assistance.           Assessment:      Diagnosis Plan   1. Coronary artery disease of native artery of native heart with stable angina pectoris  Stable, no current angina, no evidence of ischemia on recent myocardial perfusion scan.  Add low-dose beta-blockers and optimize as tolerated.  Continue aspirin and statin.      2. Chronic HFrEF (heart failure with reduced ejection fraction)  Stable and clinically euvolemic.  Continue current dose of Lasix.  Add Toprol-XL 25 mg daily for GDMT.  Continue Jardiance.  Blood pressure too low for addition of ACE inhibitor/ARB or Entresto.      3. Cardiomyopathy, dilated  Continue LifeVest, repeat echo in August, if no significant room of EF will consider AICD.        4. Permanent atrial fibrillation  Continue Eliquis for  anticoagulation.  Adding metoprolol XL 25 mg daily for rate control.      5. Essential hypertension  Blood pressure on the lower side/controlled.      6. Dyslipidemia  Continue Crestor and Zetia.      7. Tobacco abuse  Strongly advised to quit smoking.        Plan:   Assessment and recommendations as above.  Add Toprol-XL 25 mg daily, continue rest of the current medications.   Repeat echo after August 16, continue LifeVest for now.  Strongly advised to quit smoking.  FU in 3 MO, sooner as needed.  Thank you for allowing us to participate in the care of your patient.     Rick Whitten MD, FACC, [facility]        Please note that portions of this note may have been completed with a voice recognition program. Efforts were made to edit the dictations, but occasionally words are mistranscribed.

## 2024-06-18 ENCOUNTER — OFFICE VISIT (OUTPATIENT)
Age: 69
End: 2024-06-18
Payer: MEDICARE

## 2024-06-18 VITALS
HEIGHT: 68 IN | OXYGEN SATURATION: 93 % | DIASTOLIC BLOOD PRESSURE: 70 MMHG | BODY MASS INDEX: 24.04 KG/M2 | HEART RATE: 79 BPM | WEIGHT: 158.6 LBS | SYSTOLIC BLOOD PRESSURE: 106 MMHG

## 2024-06-18 DIAGNOSIS — I25.118 CORONARY ARTERY DISEASE OF NATIVE ARTERY OF NATIVE HEART WITH STABLE ANGINA PECTORIS: Primary | ICD-10-CM

## 2024-06-18 DIAGNOSIS — I42.0 CARDIOMYOPATHY, DILATED: ICD-10-CM

## 2024-06-18 DIAGNOSIS — I10 ESSENTIAL HYPERTENSION: ICD-10-CM

## 2024-06-18 DIAGNOSIS — E78.5 DYSLIPIDEMIA: ICD-10-CM

## 2024-06-18 DIAGNOSIS — I48.21 PERMANENT ATRIAL FIBRILLATION: ICD-10-CM

## 2024-06-18 DIAGNOSIS — I50.22 CHRONIC HFREF (HEART FAILURE WITH REDUCED EJECTION FRACTION): ICD-10-CM

## 2024-06-18 DIAGNOSIS — Z72.0 TOBACCO ABUSE: ICD-10-CM

## 2024-06-18 DIAGNOSIS — I25.118 CORONARY ARTERY DISEASE INVOLVING NATIVE CORONARY ARTERY OF NATIVE HEART WITH OTHER FORM OF ANGINA PECTORIS: Primary | ICD-10-CM

## 2024-06-18 RX ORDER — METOPROLOL SUCCINATE 25 MG/1
25 TABLET, EXTENDED RELEASE ORAL
Qty: 90 TABLET | Refills: 3 | Status: SHIPPED | OUTPATIENT
Start: 2024-06-18 | End: 2025-06-13

## 2024-06-18 RX ORDER — APIXABAN 5 MG/1
5 TABLET, FILM COATED ORAL EVERY 12 HOURS SCHEDULED
COMMUNITY
Start: 2024-06-15

## 2024-06-19 PROBLEM — I20.9 ANGINA PECTORIS: Status: RESOLVED | Noted: 2023-03-20 | Resolved: 2024-06-19

## 2024-06-19 PROBLEM — I50.33 ACUTE ON CHRONIC HEART FAILURE WITH PRESERVED EJECTION FRACTION (HFPEF): Status: RESOLVED | Noted: 2024-05-15 | Resolved: 2024-06-19

## 2024-06-19 PROBLEM — I50.22 CHRONIC SYSTOLIC HEART FAILURE: Status: RESOLVED | Noted: 2023-03-21 | Resolved: 2024-06-19

## 2024-07-08 RX ORDER — CLOPIDOGREL BISULFATE 75 MG/1
TABLET ORAL
Qty: 90 TABLET | Refills: 0 | OUTPATIENT
Start: 2024-07-08

## 2024-07-08 NOTE — TELEPHONE ENCOUNTER
Dr. Whitten's last note says that patient is on aspirin.  Please verify that his current antiplatelet/anticoagulant medications.  Please see if he is taking Eliquis, aspirin, and/or Plavix.  If he has only been on Eliquis and Plavix, okay to refill Plavix.  If he is on Eliquis, aspirin, and Plavix, DC Plavix.

## 2024-07-29 RX ORDER — CLOPIDOGREL BISULFATE 75 MG/1
TABLET ORAL
Qty: 90 TABLET | Refills: 0 | Status: SHIPPED | OUTPATIENT
Start: 2024-07-29

## 2024-08-16 ENCOUNTER — HOSPITAL ENCOUNTER (OUTPATIENT)
Dept: CARDIOLOGY | Facility: HOSPITAL | Age: 69
Discharge: HOME | End: 2024-08-16
Payer: MEDICARE

## 2024-08-16 DIAGNOSIS — I48.21 PERMANENT ATRIAL FIBRILLATION: ICD-10-CM

## 2024-08-16 DIAGNOSIS — I25.118 CORONARY ARTERY DISEASE INVOLVING NATIVE CORONARY ARTERY OF NATIVE HEART WITH OTHER FORM OF ANGINA PECTORIS: ICD-10-CM

## 2024-08-16 LAB
BH CV ECHO LEFT VENTRICLE GLOBAL LONGITUDINAL STRAIN: -9.3 %
BH CV ECHO MEAS - ACS: 2.36 CM
BH CV ECHO MEAS - AO MAX PG: 3.7 MMHG
BH CV ECHO MEAS - AO MEAN PG: 2 MMHG
BH CV ECHO MEAS - AO ROOT DIAM: 3.5 CM
BH CV ECHO MEAS - AO V2 MAX: 96.4 CM/SEC
BH CV ECHO MEAS - AO V2 VTI: 15.9 CM
BH CV ECHO MEAS - EDV(CUBED): 206.4 ML
BH CV ECHO MEAS - EDV(MOD-SP4): 206 ML
BH CV ECHO MEAS - EF(MOD-SP4): 33 %
BH CV ECHO MEAS - EF_3D-VOL: 38 %
BH CV ECHO MEAS - ESV(CUBED): 153.1 ML
BH CV ECHO MEAS - ESV(MOD-SP4): 138 ML
BH CV ECHO MEAS - FS: 9.5 %
BH CV ECHO MEAS - IVS/LVPW: 1.09 CM
BH CV ECHO MEAS - IVSD: 1.76 CM
BH CV ECHO MEAS - LA DIMENSION: 5.7 CM
BH CV ECHO MEAS - LAT PEAK E' VEL: 9.5 CM/SEC
BH CV ECHO MEAS - LV DIASTOLIC VOL/BSA (35-75): 111.4 CM2
BH CV ECHO MEAS - LV MASS(C)D: 495.9 GRAMS
BH CV ECHO MEAS - LV SYSTOLIC VOL/BSA (12-30): 74.6 CM2
BH CV ECHO MEAS - LVIDD: 5.9 CM
BH CV ECHO MEAS - LVIDS: 5.4 CM
BH CV ECHO MEAS - LVPWD: 1.62 CM
BH CV ECHO MEAS - MED PEAK E' VEL: 6 CM/SEC
BH CV ECHO MEAS - MV DEC TIME: 0.15 SEC
BH CV ECHO MEAS - MV E MAX VEL: 99.2 CM/SEC
BH CV ECHO MEAS - RAP SYSTOLE: 10 MMHG
BH CV ECHO MEAS - RVDD: 3.4 CM
BH CV ECHO MEAS - RVSP: 65.3 MMHG
BH CV ECHO MEAS - SV(MOD-SP4): 68 ML
BH CV ECHO MEAS - SVI(MOD-SP4): 36.8 ML/M2
BH CV ECHO MEAS - TR MAX PG: 55.3 MMHG
BH CV ECHO MEAS - TR MAX VEL: 371.9 CM/SEC
BH CV ECHO MEASUREMENTS AVERAGE E/E' RATIO: 12.8
LEFT ATRIUM VOLUME INDEX: 67 ML/M2
LV EF 2D ECHO EST: 40 %

## 2024-08-16 PROCEDURE — 93306 TTE W/DOPPLER COMPLETE: CPT

## 2024-08-21 ENCOUNTER — TELEPHONE (OUTPATIENT)
Dept: CARDIOLOGY | Facility: CLINIC | Age: 69
End: 2024-08-21
Payer: MEDICARE

## 2024-08-21 NOTE — TELEPHONE ENCOUNTER
Per LIU called pt to let him know EF improved so he can take life vest off. Pt voiced understanding

## 2024-09-24 ENCOUNTER — OFFICE VISIT (OUTPATIENT)
Dept: CARDIOLOGY | Facility: CLINIC | Age: 69
End: 2024-09-24
Payer: MEDICARE

## 2024-09-24 VITALS
OXYGEN SATURATION: 97 % | WEIGHT: 167.2 LBS | SYSTOLIC BLOOD PRESSURE: 132 MMHG | DIASTOLIC BLOOD PRESSURE: 64 MMHG | HEIGHT: 67 IN | BODY MASS INDEX: 26.24 KG/M2 | HEART RATE: 82 BPM

## 2024-09-24 DIAGNOSIS — I10 ESSENTIAL HYPERTENSION: ICD-10-CM

## 2024-09-24 DIAGNOSIS — I50.22 CHRONIC HFREF (HEART FAILURE WITH REDUCED EJECTION FRACTION): Primary | ICD-10-CM

## 2024-09-24 DIAGNOSIS — I42.9 CARDIOMYOPATHY, UNSPECIFIED TYPE: ICD-10-CM

## 2024-09-24 DIAGNOSIS — I48.21 PERMANENT ATRIAL FIBRILLATION: ICD-10-CM

## 2024-09-24 DIAGNOSIS — I10 HYPERTENSION, UNSPECIFIED TYPE: ICD-10-CM

## 2024-09-24 DIAGNOSIS — E78.5 DYSLIPIDEMIA: ICD-10-CM

## 2024-09-24 DIAGNOSIS — I42.0 CARDIOMYOPATHY, DILATED: ICD-10-CM

## 2024-09-24 DIAGNOSIS — R42 DIZZINESS: ICD-10-CM

## 2024-09-24 DIAGNOSIS — Z72.0 TOBACCO ABUSE: ICD-10-CM

## 2024-09-24 DIAGNOSIS — I25.10 CORONARY ARTERY DISEASE INVOLVING NATIVE CORONARY ARTERY OF NATIVE HEART WITHOUT ANGINA PECTORIS: Primary | ICD-10-CM

## 2024-09-24 DIAGNOSIS — I50.22 CHRONIC HFREF (HEART FAILURE WITH REDUCED EJECTION FRACTION): ICD-10-CM

## 2024-09-24 RX ORDER — FUROSEMIDE 20 MG
20 TABLET ORAL 2 TIMES DAILY
COMMUNITY

## 2024-10-10 ENCOUNTER — TELEPHONE (OUTPATIENT)
Dept: CARDIOLOGY | Facility: CLINIC | Age: 69
End: 2024-10-10
Payer: MEDICARE

## 2024-10-10 NOTE — TELEPHONE ENCOUNTER
----- Message from Rick Whitten sent at 10/9/2024  4:40 PM EDT -----  Cardiac monitor showed permanent atrial fibrillation which is his known condition.  Overall heart rate is controlled and no other significant arrhythmias are noted.  Continue same treatment, keep scheduled appointment for follow-up.

## (undated) DEVICE — GUIDE CATHETER: Brand: MACH1™

## (undated) DEVICE — CATH DIAG EXPO M/ PK 5F FL4/FR4 PIG

## (undated) DEVICE — MODEL AT P65, P/N 701554-001KIT CONTENTS: HAND CONTROLLER, 3-WAY HIGH-PRESSURE STOPCOCK WITH ROTATING END AND PREMIUM HIGH-PRESSURE TUBING: Brand: ANGIOTOUCH® KIT

## (undated) DEVICE — MODEL BT2000 P/N 700287-012KIT CONTENTS: MANIFOLD WITH SALINE AND CONTRAST PORTS, SALINE TUBING WITH SPIKE AND HAND SYRINGE, TRANSDUCER: Brand: BT2000 AUTOMATED MANIFOLD KIT

## (undated) DEVICE — INTRO SHEATH PRELUDE IDEAL SPRNG COIL 021 6F 23X80CM

## (undated) DEVICE — GW PERIPH VASC ADX J/TP SS .035 150CM 3MM

## (undated) DEVICE — RADIFOCUS GLIDEWIRE: Brand: GLIDEWIRE

## (undated) DEVICE — CATH DIAG EXPO .045 FL3  5F 100CM

## (undated) DEVICE — MINI TREK CORONARY DILATATION CATHETER 2.0 MM X 12 MM / RAPID-EXCHANGE: Brand: MINI TREK

## (undated) DEVICE — DEV INFL MONARCH 25W

## (undated) DEVICE — CATH DIAG EXPO M/ PK 6FR FL4/FR4 PIG 3PK

## (undated) DEVICE — NDL PERC 1PRT THNWALL W/BASEPLT 18G 7CM

## (undated) DEVICE — PK CATH CARD 10

## (undated) DEVICE — ANGIO-SEAL VIP VASCULAR CLOSURE DEVICE: Brand: ANGIO-SEAL

## (undated) DEVICE — GW INQWIRE FC PTFE STD J/1.5 .035 260

## (undated) DEVICE — DEV COMP RAD PRELUDESYNC 24CM

## (undated) DEVICE — GW PT 2 MS .014 185CM STR TP

## (undated) DEVICE — PINNACLE INTRODUCER SHEATH: Brand: PINNACLE

## (undated) DEVICE — ADULT, W/LG. BACK PAD, RADIOTRANSPARENT ELEMENT AND LEAD WIRE: Brand: DEFIBRILLATION ELECTRODES